# Patient Record
Sex: FEMALE | Race: BLACK OR AFRICAN AMERICAN | NOT HISPANIC OR LATINO
[De-identification: names, ages, dates, MRNs, and addresses within clinical notes are randomized per-mention and may not be internally consistent; named-entity substitution may affect disease eponyms.]

---

## 2017-03-13 ENCOUNTER — LABORATORY RESULT (OUTPATIENT)
Age: 62
End: 2017-03-13

## 2017-03-13 ENCOUNTER — OUTPATIENT (OUTPATIENT)
Dept: OUTPATIENT SERVICES | Facility: HOSPITAL | Age: 62
LOS: 1 days | End: 2017-03-13

## 2017-03-13 ENCOUNTER — APPOINTMENT (OUTPATIENT)
Dept: INTERNAL MEDICINE | Facility: HOSPITAL | Age: 62
End: 2017-03-13

## 2017-03-13 VITALS — HEIGHT: 60 IN | BODY MASS INDEX: 45.55 KG/M2 | WEIGHT: 232 LBS

## 2017-03-13 VITALS — HEART RATE: 85 BPM | DIASTOLIC BLOOD PRESSURE: 80 MMHG | SYSTOLIC BLOOD PRESSURE: 122 MMHG

## 2017-03-13 LAB
BASOPHILS # BLD AUTO: 0.01 K/UL — SIGNIFICANT CHANGE UP (ref 0–0.2)
BASOPHILS NFR BLD AUTO: 0.2 % — SIGNIFICANT CHANGE UP (ref 0–2)
BUN SERPL-MCNC: 10 MG/DL — SIGNIFICANT CHANGE UP (ref 7–23)
CALCIUM SERPL-MCNC: 9.5 MG/DL — SIGNIFICANT CHANGE UP (ref 8.4–10.5)
CHLORIDE SERPL-SCNC: 105 MMOL/L — SIGNIFICANT CHANGE UP (ref 98–107)
CO2 SERPL-SCNC: 27 MMOL/L — SIGNIFICANT CHANGE UP (ref 22–31)
CREAT SERPL-MCNC: 0.6 MG/DL — SIGNIFICANT CHANGE UP (ref 0.5–1.3)
EOSINOPHIL # BLD AUTO: 0.11 K/UL — SIGNIFICANT CHANGE UP (ref 0–0.5)
EOSINOPHIL NFR BLD AUTO: 1.7 % — SIGNIFICANT CHANGE UP (ref 0–6)
GLUCOSE BLDC GLUCOMTR-MCNC: 87
GLUCOSE SERPL-MCNC: 90 MG/DL — SIGNIFICANT CHANGE UP (ref 70–99)
HBA1C BLD-MCNC: 6.4 % — HIGH (ref 4–5.6)
HCT VFR BLD CALC: 41.6 % — SIGNIFICANT CHANGE UP (ref 34.5–45)
HGB BLD-MCNC: 13.6 G/DL — SIGNIFICANT CHANGE UP (ref 11.5–15.5)
IMM GRANULOCYTES NFR BLD AUTO: 0.2 % — SIGNIFICANT CHANGE UP (ref 0–1.5)
LYMPHOCYTES # BLD AUTO: 2.78 K/UL — SIGNIFICANT CHANGE UP (ref 1–3.3)
LYMPHOCYTES # BLD AUTO: 43.4 % — SIGNIFICANT CHANGE UP (ref 13–44)
MCHC RBC-ENTMCNC: 31.3 PG — SIGNIFICANT CHANGE UP (ref 27–34)
MCHC RBC-ENTMCNC: 32.7 % — SIGNIFICANT CHANGE UP (ref 32–36)
MCV RBC AUTO: 95.6 FL — SIGNIFICANT CHANGE UP (ref 80–100)
MONOCYTES # BLD AUTO: 0.33 K/UL — SIGNIFICANT CHANGE UP (ref 0–0.9)
MONOCYTES NFR BLD AUTO: 5.2 % — SIGNIFICANT CHANGE UP (ref 2–14)
NEUTROPHILS # BLD AUTO: 3.16 K/UL — SIGNIFICANT CHANGE UP (ref 1.8–7.4)
NEUTROPHILS NFR BLD AUTO: 49.3 % — SIGNIFICANT CHANGE UP (ref 43–77)
PLATELET # BLD AUTO: 239 K/UL — SIGNIFICANT CHANGE UP (ref 150–400)
PMV BLD: 11 FL — SIGNIFICANT CHANGE UP (ref 7–13)
POTASSIUM SERPL-MCNC: 4 MMOL/L — SIGNIFICANT CHANGE UP (ref 3.5–5.3)
POTASSIUM SERPL-SCNC: 4 MMOL/L — SIGNIFICANT CHANGE UP (ref 3.5–5.3)
RBC # BLD: 4.35 M/UL — SIGNIFICANT CHANGE UP (ref 3.8–5.2)
RBC # FLD: 13 % — SIGNIFICANT CHANGE UP (ref 10.3–14.5)
SODIUM SERPL-SCNC: 145 MMOL/L — SIGNIFICANT CHANGE UP (ref 135–145)
WBC # BLD: 6.4 K/UL — SIGNIFICANT CHANGE UP (ref 3.8–10.5)
WBC # FLD AUTO: 6.4 K/UL — SIGNIFICANT CHANGE UP (ref 3.8–10.5)

## 2017-03-14 LAB
CREAT UR-MCNC: 62 MG/DL — SIGNIFICANT CHANGE UP
MICROALBUMIN UR-MCNC: < 0.3 MG/DL — SIGNIFICANT CHANGE UP

## 2017-03-15 DIAGNOSIS — E66.9 OBESITY, UNSPECIFIED: ICD-10-CM

## 2017-03-15 DIAGNOSIS — E11.9 TYPE 2 DIABETES MELLITUS WITHOUT COMPLICATIONS: ICD-10-CM

## 2017-03-15 DIAGNOSIS — R14.0 ABDOMINAL DISTENSION (GASEOUS): ICD-10-CM

## 2017-03-15 DIAGNOSIS — I10 ESSENTIAL (PRIMARY) HYPERTENSION: ICD-10-CM

## 2017-03-15 DIAGNOSIS — N62 HYPERTROPHY OF BREAST: ICD-10-CM

## 2017-03-15 DIAGNOSIS — K30 FUNCTIONAL DYSPEPSIA: ICD-10-CM

## 2017-04-18 ENCOUNTER — OUTPATIENT (OUTPATIENT)
Dept: OUTPATIENT SERVICES | Facility: HOSPITAL | Age: 62
LOS: 1 days | End: 2017-04-18

## 2017-04-18 ENCOUNTER — APPOINTMENT (OUTPATIENT)
Dept: PLASTIC SURGERY | Facility: HOSPITAL | Age: 62
End: 2017-04-18

## 2017-04-18 VITALS
WEIGHT: 237 LBS | BODY MASS INDEX: 46.53 KG/M2 | DIASTOLIC BLOOD PRESSURE: 78 MMHG | HEIGHT: 60 IN | SYSTOLIC BLOOD PRESSURE: 135 MMHG | HEART RATE: 85 BPM

## 2017-04-19 DIAGNOSIS — N62 HYPERTROPHY OF BREAST: ICD-10-CM

## 2017-04-27 ENCOUNTER — APPOINTMENT (OUTPATIENT)
Dept: GASTROENTEROLOGY | Facility: HOSPITAL | Age: 62
End: 2017-04-27

## 2017-05-02 ENCOUNTER — RESULT REVIEW (OUTPATIENT)
Age: 62
End: 2017-05-02

## 2017-05-03 ENCOUNTER — APPOINTMENT (OUTPATIENT)
Dept: OBGYN | Facility: HOSPITAL | Age: 62
End: 2017-05-03

## 2017-05-03 ENCOUNTER — LABORATORY RESULT (OUTPATIENT)
Age: 62
End: 2017-05-03

## 2017-05-03 ENCOUNTER — OUTPATIENT (OUTPATIENT)
Dept: OUTPATIENT SERVICES | Facility: HOSPITAL | Age: 62
LOS: 1 days | End: 2017-05-03

## 2017-05-03 VITALS
HEART RATE: 85 BPM | BODY MASS INDEX: 45.7 KG/M2 | WEIGHT: 234 LBS | SYSTOLIC BLOOD PRESSURE: 131 MMHG | DIASTOLIC BLOOD PRESSURE: 82 MMHG

## 2017-05-03 LAB
HBV SURFACE AG SER-ACNC: NEGATIVE — SIGNIFICANT CHANGE UP
HIV1 AG SER QL: SIGNIFICANT CHANGE UP
HIV1+2 AB SPEC QL: SIGNIFICANT CHANGE UP

## 2017-05-04 DIAGNOSIS — Z01.419 ENCOUNTER FOR GYNECOLOGICAL EXAMINATION (GENERAL) (ROUTINE) WITHOUT ABNORMAL FINDINGS: ICD-10-CM

## 2017-05-04 LAB — T PALLIDUM AB TITR SER: NEGATIVE — SIGNIFICANT CHANGE UP

## 2017-05-05 LAB
CYTOLOGY SPEC DOC CYTO: SIGNIFICANT CHANGE UP
HPV HIGH+LOW RISK DNA PNL CVX: SIGNIFICANT CHANGE UP

## 2017-05-30 ENCOUNTER — FORM ENCOUNTER (OUTPATIENT)
Age: 62
End: 2017-05-30

## 2017-05-31 ENCOUNTER — APPOINTMENT (OUTPATIENT)
Dept: MAMMOGRAPHY | Facility: IMAGING CENTER | Age: 62
End: 2017-05-31

## 2017-05-31 ENCOUNTER — APPOINTMENT (OUTPATIENT)
Dept: ULTRASOUND IMAGING | Facility: IMAGING CENTER | Age: 62
End: 2017-05-31

## 2017-05-31 ENCOUNTER — OUTPATIENT (OUTPATIENT)
Dept: OUTPATIENT SERVICES | Facility: HOSPITAL | Age: 62
LOS: 1 days | End: 2017-05-31
Payer: MEDICAID

## 2017-05-31 DIAGNOSIS — Z00.8 ENCOUNTER FOR OTHER GENERAL EXAMINATION: ICD-10-CM

## 2017-05-31 PROCEDURE — 77063 BREAST TOMOSYNTHESIS BI: CPT

## 2017-05-31 PROCEDURE — 77067 SCR MAMMO BI INCL CAD: CPT

## 2017-07-13 ENCOUNTER — APPOINTMENT (OUTPATIENT)
Dept: GASTROENTEROLOGY | Facility: HOSPITAL | Age: 62
End: 2017-07-13

## 2017-07-25 ENCOUNTER — RX RENEWAL (OUTPATIENT)
Age: 62
End: 2017-07-25

## 2017-08-01 ENCOUNTER — OTHER (OUTPATIENT)
Age: 62
End: 2017-08-01

## 2017-08-02 ENCOUNTER — RESULT REVIEW (OUTPATIENT)
Age: 62
End: 2017-08-02

## 2017-09-06 ENCOUNTER — APPOINTMENT (OUTPATIENT)
Dept: INTERNAL MEDICINE | Facility: HOSPITAL | Age: 62
End: 2017-09-06

## 2017-09-06 ENCOUNTER — LABORATORY RESULT (OUTPATIENT)
Age: 62
End: 2017-09-06

## 2017-09-06 ENCOUNTER — OUTPATIENT (OUTPATIENT)
Dept: OUTPATIENT SERVICES | Facility: HOSPITAL | Age: 62
LOS: 1 days | End: 2017-09-06

## 2017-09-06 VITALS — DIASTOLIC BLOOD PRESSURE: 74 MMHG | SYSTOLIC BLOOD PRESSURE: 135 MMHG | HEART RATE: 81 BPM

## 2017-09-06 VITALS — WEIGHT: 230 LBS | HEIGHT: 60 IN | BODY MASS INDEX: 45.16 KG/M2

## 2017-09-06 LAB
CHOLEST SERPL-MCNC: 160 MG/DL — SIGNIFICANT CHANGE UP (ref 120–199)
HBA1C BLD-MCNC: 6.5 % — HIGH (ref 4–5.6)
HDLC SERPL-MCNC: 66 MG/DL — HIGH (ref 45–65)
LIPID PNL WITH DIRECT LDL SERPL: 76 MG/DL — SIGNIFICANT CHANGE UP
TRIGL SERPL-MCNC: 126 MG/DL — SIGNIFICANT CHANGE UP (ref 10–149)

## 2017-09-07 DIAGNOSIS — E11.9 TYPE 2 DIABETES MELLITUS WITHOUT COMPLICATIONS: ICD-10-CM

## 2017-09-07 DIAGNOSIS — Z00.00 ENCOUNTER FOR GENERAL ADULT MEDICAL EXAMINATION WITHOUT ABNORMAL FINDINGS: ICD-10-CM

## 2017-09-07 DIAGNOSIS — I10 ESSENTIAL (PRIMARY) HYPERTENSION: ICD-10-CM

## 2017-09-07 DIAGNOSIS — E78.5 HYPERLIPIDEMIA, UNSPECIFIED: ICD-10-CM

## 2017-09-07 DIAGNOSIS — Z23 ENCOUNTER FOR IMMUNIZATION: ICD-10-CM

## 2017-09-07 LAB — GLUCOSE BLDC GLUCOMTR-MCNC: 104

## 2017-09-19 ENCOUNTER — APPOINTMENT (OUTPATIENT)
Dept: PLASTIC SURGERY | Facility: HOSPITAL | Age: 62
End: 2017-09-19

## 2017-09-19 ENCOUNTER — MED ADMIN CHARGE (OUTPATIENT)
Age: 62
End: 2017-09-19

## 2017-10-02 ENCOUNTER — APPOINTMENT (OUTPATIENT)
Dept: OPHTHALMOLOGY | Facility: CLINIC | Age: 62
End: 2017-10-02

## 2018-02-28 ENCOUNTER — LABORATORY RESULT (OUTPATIENT)
Age: 63
End: 2018-02-28

## 2018-02-28 ENCOUNTER — OUTPATIENT (OUTPATIENT)
Dept: OUTPATIENT SERVICES | Facility: HOSPITAL | Age: 63
LOS: 1 days | End: 2018-02-28

## 2018-02-28 ENCOUNTER — APPOINTMENT (OUTPATIENT)
Dept: INTERNAL MEDICINE | Facility: HOSPITAL | Age: 63
End: 2018-02-28

## 2018-02-28 VITALS
HEIGHT: 60 IN | BODY MASS INDEX: 45.16 KG/M2 | DIASTOLIC BLOOD PRESSURE: 80 MMHG | SYSTOLIC BLOOD PRESSURE: 132 MMHG | WEIGHT: 230 LBS

## 2018-02-28 DIAGNOSIS — E11.9 TYPE 2 DIABETES MELLITUS W/OUT COMPLICATIONS: ICD-10-CM

## 2018-02-28 DIAGNOSIS — E66.9 OBESITY, UNSPECIFIED: ICD-10-CM

## 2018-02-28 LAB
ALBUMIN SERPL ELPH-MCNC: 4.6 G/DL — SIGNIFICANT CHANGE UP (ref 3.3–5)
ALP SERPL-CCNC: 83 U/L — SIGNIFICANT CHANGE UP (ref 40–120)
ALT FLD-CCNC: 29 U/L — SIGNIFICANT CHANGE UP (ref 4–33)
AST SERPL-CCNC: 30 U/L — SIGNIFICANT CHANGE UP (ref 4–32)
BASOPHILS # BLD AUTO: 0.03 K/UL — SIGNIFICANT CHANGE UP (ref 0–0.2)
BASOPHILS NFR BLD AUTO: 0.5 % — SIGNIFICANT CHANGE UP (ref 0–2)
BILIRUB SERPL-MCNC: 0.6 MG/DL — SIGNIFICANT CHANGE UP (ref 0.2–1.2)
BUN SERPL-MCNC: 16 MG/DL — SIGNIFICANT CHANGE UP (ref 7–23)
CALCIUM SERPL-MCNC: 9.4 MG/DL — SIGNIFICANT CHANGE UP (ref 8.4–10.5)
CHLORIDE SERPL-SCNC: 105 MMOL/L — SIGNIFICANT CHANGE UP (ref 98–107)
CO2 SERPL-SCNC: 28 MMOL/L — SIGNIFICANT CHANGE UP (ref 22–31)
CREAT SERPL-MCNC: 0.63 MG/DL — SIGNIFICANT CHANGE UP (ref 0.5–1.3)
CREAT UR-MCNC: 220 MG/DL — SIGNIFICANT CHANGE UP
EOSINOPHIL # BLD AUTO: 0.11 K/UL — SIGNIFICANT CHANGE UP (ref 0–0.5)
EOSINOPHIL NFR BLD AUTO: 1.9 % — SIGNIFICANT CHANGE UP (ref 0–6)
GLUCOSE BLDC GLUCOMTR-MCNC: 101
GLUCOSE SERPL-MCNC: 96 MG/DL — SIGNIFICANT CHANGE UP (ref 70–99)
HBA1C BLD-MCNC: 6.5 % — HIGH (ref 4–5.6)
HCT VFR BLD CALC: 40.8 % — SIGNIFICANT CHANGE UP (ref 34.5–45)
HGB BLD-MCNC: 13.7 G/DL — SIGNIFICANT CHANGE UP (ref 11.5–15.5)
IMM GRANULOCYTES # BLD AUTO: 0.01 # — SIGNIFICANT CHANGE UP
IMM GRANULOCYTES NFR BLD AUTO: 0.2 % — SIGNIFICANT CHANGE UP (ref 0–1.5)
LYMPHOCYTES # BLD AUTO: 2.15 K/UL — SIGNIFICANT CHANGE UP (ref 1–3.3)
LYMPHOCYTES # BLD AUTO: 36.8 % — SIGNIFICANT CHANGE UP (ref 13–44)
MCHC RBC-ENTMCNC: 31.9 PG — SIGNIFICANT CHANGE UP (ref 27–34)
MCHC RBC-ENTMCNC: 33.6 % — SIGNIFICANT CHANGE UP (ref 32–36)
MCV RBC AUTO: 94.9 FL — SIGNIFICANT CHANGE UP (ref 80–100)
MICROALBUMIN UR-MCNC: 2.1 MG/DL — SIGNIFICANT CHANGE UP
MICROALBUMIN/CREAT UR-RTO: 10 MG/G — SIGNIFICANT CHANGE UP (ref 0–30)
MONOCYTES # BLD AUTO: 0.45 K/UL — SIGNIFICANT CHANGE UP (ref 0–0.9)
MONOCYTES NFR BLD AUTO: 7.7 % — SIGNIFICANT CHANGE UP (ref 2–14)
NEUTROPHILS # BLD AUTO: 3.09 K/UL — SIGNIFICANT CHANGE UP (ref 1.8–7.4)
NEUTROPHILS NFR BLD AUTO: 52.9 % — SIGNIFICANT CHANGE UP (ref 43–77)
NRBC # FLD: 0 — SIGNIFICANT CHANGE UP
PLATELET # BLD AUTO: 205 K/UL — SIGNIFICANT CHANGE UP (ref 150–400)
PMV BLD: 11.2 FL — SIGNIFICANT CHANGE UP (ref 7–13)
POTASSIUM SERPL-MCNC: 3.9 MMOL/L — SIGNIFICANT CHANGE UP (ref 3.5–5.3)
POTASSIUM SERPL-SCNC: 3.9 MMOL/L — SIGNIFICANT CHANGE UP (ref 3.5–5.3)
PROT SERPL-MCNC: 7.6 G/DL — SIGNIFICANT CHANGE UP (ref 6–8.3)
RBC # BLD: 4.3 M/UL — SIGNIFICANT CHANGE UP (ref 3.8–5.2)
RBC # FLD: 12.8 % — SIGNIFICANT CHANGE UP (ref 10.3–14.5)
SODIUM SERPL-SCNC: 144 MMOL/L — SIGNIFICANT CHANGE UP (ref 135–145)
WBC # BLD: 5.84 K/UL — SIGNIFICANT CHANGE UP (ref 3.8–10.5)
WBC # FLD AUTO: 5.84 K/UL — SIGNIFICANT CHANGE UP (ref 3.8–10.5)

## 2018-02-28 RX ORDER — ZOSTER VACCINE RECOMBINANT, ADJUVANTED 50 MCG/0.5
50 KIT INTRAMUSCULAR
Qty: 1 | Refills: 0 | Status: ACTIVE | COMMUNITY
Start: 2018-02-28 | End: 1900-01-01

## 2018-03-06 DIAGNOSIS — E11.69 TYPE 2 DIABETES MELLITUS WITH OTHER SPECIFIED COMPLICATION: ICD-10-CM

## 2018-03-06 DIAGNOSIS — E11.9 TYPE 2 DIABETES MELLITUS WITHOUT COMPLICATIONS: ICD-10-CM

## 2018-03-06 DIAGNOSIS — E66.01 MORBID (SEVERE) OBESITY DUE TO EXCESS CALORIES: ICD-10-CM

## 2018-03-06 DIAGNOSIS — Z00.00 ENCOUNTER FOR GENERAL ADULT MEDICAL EXAMINATION WITHOUT ABNORMAL FINDINGS: ICD-10-CM

## 2018-03-06 DIAGNOSIS — I10 ESSENTIAL (PRIMARY) HYPERTENSION: ICD-10-CM

## 2018-03-06 DIAGNOSIS — E78.5 HYPERLIPIDEMIA, UNSPECIFIED: ICD-10-CM

## 2018-03-13 ENCOUNTER — MED ADMIN CHARGE (OUTPATIENT)
Age: 63
End: 2018-03-13

## 2018-05-04 ENCOUNTER — APPOINTMENT (OUTPATIENT)
Dept: OPHTHALMOLOGY | Facility: CLINIC | Age: 63
End: 2018-05-04

## 2018-05-25 ENCOUNTER — APPOINTMENT (OUTPATIENT)
Dept: OPHTHALMOLOGY | Facility: CLINIC | Age: 63
End: 2018-05-25

## 2018-07-05 ENCOUNTER — FORM ENCOUNTER (OUTPATIENT)
Age: 63
End: 2018-07-05

## 2018-07-06 ENCOUNTER — APPOINTMENT (OUTPATIENT)
Dept: MAMMOGRAPHY | Facility: IMAGING CENTER | Age: 63
End: 2018-07-06
Payer: MEDICAID

## 2018-07-06 ENCOUNTER — OUTPATIENT (OUTPATIENT)
Dept: OUTPATIENT SERVICES | Facility: HOSPITAL | Age: 63
LOS: 1 days | End: 2018-07-06
Payer: MEDICAID

## 2018-07-06 ENCOUNTER — APPOINTMENT (OUTPATIENT)
Dept: ULTRASOUND IMAGING | Facility: IMAGING CENTER | Age: 63
End: 2018-07-06
Payer: MEDICAID

## 2018-07-06 DIAGNOSIS — Z00.8 ENCOUNTER FOR OTHER GENERAL EXAMINATION: ICD-10-CM

## 2018-07-06 PROCEDURE — 76641 ULTRASOUND BREAST COMPLETE: CPT | Mod: 26,50

## 2018-07-06 PROCEDURE — 77066 DX MAMMO INCL CAD BI: CPT | Mod: 26

## 2018-07-06 PROCEDURE — G0279: CPT | Mod: 26

## 2018-07-06 PROCEDURE — G0279: CPT

## 2018-07-06 PROCEDURE — 77066 DX MAMMO INCL CAD BI: CPT

## 2018-07-06 PROCEDURE — 76641 ULTRASOUND BREAST COMPLETE: CPT

## 2018-07-16 ENCOUNTER — APPOINTMENT (OUTPATIENT)
Dept: MRI IMAGING | Facility: IMAGING CENTER | Age: 63
End: 2018-07-16

## 2018-07-16 ENCOUNTER — APPOINTMENT (OUTPATIENT)
Dept: ULTRASOUND IMAGING | Facility: IMAGING CENTER | Age: 63
End: 2018-07-16

## 2018-07-23 ENCOUNTER — APPOINTMENT (OUTPATIENT)
Dept: MRI IMAGING | Facility: IMAGING CENTER | Age: 63
End: 2018-07-23

## 2018-07-23 ENCOUNTER — APPOINTMENT (OUTPATIENT)
Dept: ULTRASOUND IMAGING | Facility: IMAGING CENTER | Age: 63
End: 2018-07-23

## 2018-07-27 ENCOUNTER — APPOINTMENT (OUTPATIENT)
Dept: INTERNAL MEDICINE | Facility: HOSPITAL | Age: 63
End: 2018-07-27
Payer: MEDICAID

## 2018-07-27 ENCOUNTER — OUTPATIENT (OUTPATIENT)
Dept: OUTPATIENT SERVICES | Facility: HOSPITAL | Age: 63
LOS: 1 days | End: 2018-07-27

## 2018-07-27 VITALS — DIASTOLIC BLOOD PRESSURE: 90 MMHG | SYSTOLIC BLOOD PRESSURE: 150 MMHG

## 2018-07-27 VITALS — HEIGHT: 60 IN | WEIGHT: 229 LBS | BODY MASS INDEX: 44.96 KG/M2

## 2018-07-27 DIAGNOSIS — Z87.19 PERSONAL HISTORY OF OTHER DISEASES OF THE DIGESTIVE SYSTEM: ICD-10-CM

## 2018-07-27 DIAGNOSIS — R07.89 OTHER CHEST PAIN: ICD-10-CM

## 2018-07-27 DIAGNOSIS — R10.13 EPIGASTRIC PAIN: ICD-10-CM

## 2018-07-27 DIAGNOSIS — Z01.419 ENCOUNTER FOR GYNECOLOGICAL EXAMINATION (GENERAL) (ROUTINE) W/OUT ABNORMAL FINDINGS: ICD-10-CM

## 2018-07-27 PROCEDURE — 99214 OFFICE O/P EST MOD 30 MIN: CPT | Mod: GC

## 2018-07-27 RX ORDER — ATORVASTATIN CALCIUM 40 MG/1
40 TABLET, FILM COATED ORAL
Qty: 30 | Refills: 6 | Status: DISCONTINUED | COMMUNITY
Start: 2017-09-06 | End: 2018-07-27

## 2018-07-27 NOTE — REVIEW OF SYSTEMS
[Muscle Pain] : muscle pain [Negative] : Heme/Lymph [FreeTextEntry1] : left breast swelling, redness, pain in the past. Not at presentation

## 2018-07-27 NOTE — PHYSICAL EXAM
[No Acute Distress] : no acute distress [Well-Appearing] : well-appearing [Normal Sclera/Conjunctiva] : normal sclera/conjunctiva [EOMI] : extraocular movements intact [No JVD] : no jugular venous distention [Supple] : supple [No Lymphadenopathy] : no lymphadenopathy [No Respiratory Distress] : no respiratory distress  [Clear to Auscultation] : lungs were clear to auscultation bilaterally [No Accessory Muscle Use] : no accessory muscle use [Normal Rate] : normal rate  [Regular Rhythm] : with a regular rhythm [Normal S1, S2] : normal S1 and S2 [No Murmur] : no murmur heard [No Carotid Bruits] : no carotid bruits [Soft] : abdomen soft [Non Tender] : non-tender [Non-distended] : non-distended [No Masses] : no abdominal mass palpated [No Rash] : no rash [Normal Affect] : the affect was normal [Normal Insight/Judgement] : insight and judgment were intact [Comprehensive Foot Exam Normal] : Right and left foot were examined and both feet are normal. No ulcers in either foot. Toes are normal and with full ROM.  Normal tactile sensation with monofilament testing throughout both feet [de-identified] : no puckering; nontender, notedematous; no discharge elicited

## 2018-07-27 NOTE — ASSESSMENT
[FreeTextEntry1] : Patient will call if any other referrals needed; radiology should email Dr. Morgan if other referrals required. \par \par Follow up in 10 weeks

## 2018-07-27 NOTE — PHYSICAL EXAM
[No Acute Distress] : no acute distress [Well-Appearing] : well-appearing [Normal Sclera/Conjunctiva] : normal sclera/conjunctiva [EOMI] : extraocular movements intact [No JVD] : no jugular venous distention [Supple] : supple [No Lymphadenopathy] : no lymphadenopathy [No Respiratory Distress] : no respiratory distress  [Clear to Auscultation] : lungs were clear to auscultation bilaterally [No Accessory Muscle Use] : no accessory muscle use [Normal Rate] : normal rate  [Regular Rhythm] : with a regular rhythm [Normal S1, S2] : normal S1 and S2 [No Murmur] : no murmur heard [No Carotid Bruits] : no carotid bruits [Soft] : abdomen soft [Non Tender] : non-tender [Non-distended] : non-distended [No Masses] : no abdominal mass palpated [No Rash] : no rash [Normal Affect] : the affect was normal [Normal Insight/Judgement] : insight and judgment were intact [Comprehensive Foot Exam Normal] : Right and left foot were examined and both feet are normal. No ulcers in either foot. Toes are normal and with full ROM.  Normal tactile sensation with monofilament testing throughout both feet [de-identified] : no puckering; nontender, notedematous; no discharge elicited

## 2018-07-27 NOTE — HISTORY OF PRESENT ILLNESS
[FreeTextEntry1] : requires referral for Ultrasound Guided Core Biopsy of Breast\par 6-month follow up  [de-identified] : The patient is a 62 y/o female with PMHx of DM, HTN, HLD, and breast abscesses in the past who is here for a 6 month follow up, and also required a referral for an US-guided core biopsy of the left breast.\par \par The patient was found to have BIRDADs 4 on mammography and was sent for MRI and US Guided Biopsy after the prior visit. The MRI was performed but radiology was unable to perform the biopsy because no referral could be found. Therefore, the patient is here today to obtain a new one. \par The patient stated that during her last mammography in early July, she was having brown nipple discharge. In addition, she had an episode of a very large, red swollen breast, with associated fever. She states that she has had these types of problems with her breast her whole life. \par \par She is also here to follow up with health maintenance. The patient stopped taking amlodipine 5 mg 4 months ago. She spoke with her pharmacist after she was having muscle cramps and was told that it may be secondary to the amlodipine, so the patient stopped taking it. Prior to stopping, she was advised to call the medicine office, which she did not. The patient is adherent with the remainder of her medications, however.

## 2018-07-27 NOTE — HISTORY OF PRESENT ILLNESS
[FreeTextEntry1] : requires referral for Ultrasound Guided Core Biopsy of Breast\par 6-month follow up  [de-identified] : The patient is a 64 y/o female with PMHx of DM, HTN, HLD, and breast abscesses in the past who is here for a 6 month follow up, and also required a referral for an US-guided core biopsy of the left breast.\par \par The patient was found to have BIRDADs 4 on mammography and was sent for MRI and US Guided Biopsy after the prior visit. The MRI was performed but radiology was unable to perform the biopsy because no referral could be found. Therefore, the patient is here today to obtain a new one. \par The patient stated that during her last mammography in early July, she was having brown nipple discharge. In addition, she had an episode of a very large, red swollen breast, with associated fever. She states that she has had these types of problems with her breast her whole life. \par \par She is also here to follow up with health maintenance. The patient stopped taking amlodipine 5 mg 4 months ago. She spoke with her pharmacist after she was having muscle cramps and was told that it may be secondary to the amlodipine, so the patient stopped taking it. Prior to stopping, she was advised to call the medicine office, which she did not. The patient is adherent with the remainder of her medications, however.

## 2018-07-30 DIAGNOSIS — I10 ESSENTIAL (PRIMARY) HYPERTENSION: ICD-10-CM

## 2018-07-30 DIAGNOSIS — E78.5 HYPERLIPIDEMIA, UNSPECIFIED: ICD-10-CM

## 2018-07-30 DIAGNOSIS — E11.69 TYPE 2 DIABETES MELLITUS WITH OTHER SPECIFIED COMPLICATION: ICD-10-CM

## 2018-07-30 DIAGNOSIS — R92.8 OTHER ABNORMAL AND INCONCLUSIVE FINDINGS ON DIAGNOSTIC IMAGING OF BREAST: ICD-10-CM

## 2018-07-31 LAB — GLUCOSE BLDC GLUCOMTR-MCNC: 92

## 2018-08-06 ENCOUNTER — OUTPATIENT (OUTPATIENT)
Dept: OUTPATIENT SERVICES | Facility: HOSPITAL | Age: 63
LOS: 1 days | End: 2018-08-06
Payer: MEDICAID

## 2018-08-06 ENCOUNTER — APPOINTMENT (OUTPATIENT)
Dept: ULTRASOUND IMAGING | Facility: IMAGING CENTER | Age: 63
End: 2018-08-06
Payer: MEDICAID

## 2018-08-06 DIAGNOSIS — R92.8 OTHER ABNORMAL AND INCONCLUSIVE FINDINGS ON DIAGNOSTIC IMAGING OF BREAST: ICD-10-CM

## 2018-08-06 PROCEDURE — 76642 ULTRASOUND BREAST LIMITED: CPT | Mod: 26,LT

## 2018-08-06 PROCEDURE — 76642 ULTRASOUND BREAST LIMITED: CPT

## 2018-10-01 ENCOUNTER — LABORATORY RESULT (OUTPATIENT)
Age: 63
End: 2018-10-01

## 2018-10-01 ENCOUNTER — MED ADMIN CHARGE (OUTPATIENT)
Age: 63
End: 2018-10-01

## 2018-10-01 ENCOUNTER — APPOINTMENT (OUTPATIENT)
Dept: INTERNAL MEDICINE | Facility: HOSPITAL | Age: 63
End: 2018-10-01
Payer: MEDICAID

## 2018-10-01 ENCOUNTER — OUTPATIENT (OUTPATIENT)
Dept: OUTPATIENT SERVICES | Facility: HOSPITAL | Age: 63
LOS: 1 days | End: 2018-10-01

## 2018-10-01 ENCOUNTER — OTHER (OUTPATIENT)
Age: 63
End: 2018-10-01

## 2018-10-01 VITALS — DIASTOLIC BLOOD PRESSURE: 60 MMHG | HEART RATE: 72 BPM | SYSTOLIC BLOOD PRESSURE: 130 MMHG

## 2018-10-01 VITALS — BODY MASS INDEX: 45.16 KG/M2 | WEIGHT: 230 LBS | HEIGHT: 60 IN

## 2018-10-01 LAB
CHOLEST SERPL-MCNC: 139 MG/DL — SIGNIFICANT CHANGE UP (ref 120–199)
GLUCOSE BLDC GLUCOMTR-MCNC: 103
HBA1C BLD-MCNC: 6.1 % — HIGH (ref 4–5.6)
HDLC SERPL-MCNC: 67 MG/DL — HIGH (ref 45–65)
LIPID PNL WITH DIRECT LDL SERPL: 65 MG/DL — SIGNIFICANT CHANGE UP
TRIGL SERPL-MCNC: 98 MG/DL — SIGNIFICANT CHANGE UP (ref 10–149)

## 2018-10-01 PROCEDURE — 99213 OFFICE O/P EST LOW 20 MIN: CPT | Mod: GE

## 2018-10-01 NOTE — PHYSICAL EXAM
[No Acute Distress] : no acute distress [Well Nourished] : well nourished [Well Developed] : well developed [PERRL] : pupils equal round and reactive to light [EOMI] : extraocular movements intact [No Respiratory Distress] : no respiratory distress  [Clear to Auscultation] : lungs were clear to auscultation bilaterally [Normal Rate] : normal rate  [Regular Rhythm] : with a regular rhythm [Normal S1, S2] : normal S1 and S2 [No Edema] : there was no peripheral edema [Soft] : abdomen soft [Non Tender] : non-tender [No HSM] : no HSM [Normal Bowel Sounds] : normal bowel sounds [No Rash] : no rash [No Skin Lesions] : no skin lesions [Normal Affect] : the affect was normal [Alert and Oriented x3] : oriented to person, place, and time [Normal Mood] : the mood was normal

## 2018-10-02 DIAGNOSIS — Z23 ENCOUNTER FOR IMMUNIZATION: ICD-10-CM

## 2018-10-02 DIAGNOSIS — E11.69 TYPE 2 DIABETES MELLITUS WITH OTHER SPECIFIED COMPLICATION: ICD-10-CM

## 2018-10-02 DIAGNOSIS — N63.0 UNSPECIFIED LUMP IN UNSPECIFIED BREAST: ICD-10-CM

## 2018-10-02 DIAGNOSIS — R92.8 OTHER ABNORMAL AND INCONCLUSIVE FINDINGS ON DIAGNOSTIC IMAGING OF BREAST: ICD-10-CM

## 2018-10-02 DIAGNOSIS — E78.5 HYPERLIPIDEMIA, UNSPECIFIED: ICD-10-CM

## 2018-10-02 DIAGNOSIS — I10 ESSENTIAL (PRIMARY) HYPERTENSION: ICD-10-CM

## 2018-10-02 DIAGNOSIS — Z00.00 ENCOUNTER FOR GENERAL ADULT MEDICAL EXAMINATION WITHOUT ABNORMAL FINDINGS: ICD-10-CM

## 2018-10-12 NOTE — PLAN
[FreeTextEntry1] : 63F with PMHx of HTN, DMII (last A1C 6.5% in 2/2018), HLD, abnormal mammogram in 6/2018 who presents for follow-up\par \par #HTN\par - currently well controlled on current regimen of amlodipine 5mg and HCTZ 25mg QD, cont current regimen\par \par #DM\par - Last A1C 6.5% in 2/2018, will recheck today\par - c/w metformin 500mg BID\par - Pt deferred diabetic teaching at this time; states she does not want to take her blood sugar at home. Assistance with learning was offered at this visit but the patient declined.\par - Counseled on adhering to diabetic diet low in refined sugar and participating in daily exercise\par \par #HLD\par - c/w lower dose atorvastatin 20mg as her muscle cramps are now resolved\par - will recheck lipid panel today\par \par #Abnormal Mammography\par - per patient, she went for possible biopsy, but after ultrasound was told there was nothing to biopsy.\par - resent referral for breast MRI\par - referred pt to breast clinic for further workup\par \par #HCM\par - pt due for colonoscopy this year (last in 2015), referral sent\par - received flu shot this visit, UTD on shingles vaccine, TDAP, and pneumococcal vaccine\par \par RTC in 2-3 months\par \par Case d/w Dr. North\par \par Cherise Tirado MD, MPH\par F5\par

## 2018-10-12 NOTE — END OF VISIT
[] : Resident [FreeTextEntry3] : 63f WITH t2dm, htn, hld, abnormal mammogram here today for follow-up. Will send patient to breast clinic and breast MRI to further assess findings on mammogram. Referral for GI given as pt due for colonoscopy. Repeat lipid profile and HbA1c today. Agree with plan and findings as above.

## 2018-10-12 NOTE — HISTORY OF PRESENT ILLNESS
[FreeTextEntry1] : Follow-up [de-identified] : Pt is a 63F with PMHx of HTN, DMII (last A1C 6.5% in 2/2018), HLD, abnormal mammogram in 6/2018 who presents for follow-up. Pt was last seen in clinic in 7/2018. At that time she was c/o muscle cramps and had stopped taking her amlodipine 5mg in an attempt to alleviate them. At that time she was hypertensive to 150s systolic. The decision was made to restart amlodipine 5mg and decrease the dose of atorvastatin to 20mg. She was also referred for an ultrasound and core biopsy of a mammogram in 6/2018 that showed Birads 4C lesion. \par \par Pt reports since previous visit she has been compliant with her daily medications and has not had any adverse events. The muscle cramps she was experiencing at the time of her prior visit have since resolved. She does not check her BP at home. She also does not check her blood sugar. She has previously been extensively counseled on taking her blood sugar at home, however, she does not want to take her blood sugar at home. She says taking her blood sugar at home would cause her anxiety and she does not want to do it. \par \par In regards to her abnormal mammogram in 7/2018 that showed BIRADS4 in the left breast, she reports going for an ultrasound and possible core needle biopsy. However, she was told at her ultrasound appointment that there was nothing to biopsy, so it was never performed. She has not undergone a left breast MRI. She reports no interim breast pain, nipple discharge, breast discoloration, abnormal lumps. \par \par She denies any interim sx of polyuria, polydipsia, dizziness, lightheadedness, HA, F/C/N/V, CP/SOB.

## 2019-01-03 ENCOUNTER — APPOINTMENT (OUTPATIENT)
Dept: GASTROENTEROLOGY | Facility: HOSPITAL | Age: 64
End: 2019-01-03
Payer: MEDICAID

## 2019-01-03 ENCOUNTER — OUTPATIENT (OUTPATIENT)
Dept: OUTPATIENT SERVICES | Facility: HOSPITAL | Age: 64
LOS: 1 days | End: 2019-01-03

## 2019-01-03 ENCOUNTER — OTHER (OUTPATIENT)
Age: 64
End: 2019-01-03

## 2019-01-03 VITALS
HEART RATE: 75 BPM | SYSTOLIC BLOOD PRESSURE: 121 MMHG | WEIGHT: 233 LBS | HEIGHT: 60 IN | DIASTOLIC BLOOD PRESSURE: 65 MMHG | BODY MASS INDEX: 45.75 KG/M2

## 2019-01-03 DIAGNOSIS — K63.5 POLYP OF COLON: ICD-10-CM

## 2019-01-03 PROCEDURE — 99213 OFFICE O/P EST LOW 20 MIN: CPT | Mod: GC

## 2019-01-14 ENCOUNTER — APPOINTMENT (OUTPATIENT)
Age: 64
End: 2019-01-14

## 2019-01-19 ENCOUNTER — EMERGENCY (EMERGENCY)
Facility: HOSPITAL | Age: 64
LOS: 1 days | Discharge: HOME | End: 2019-01-19
Attending: EMERGENCY MEDICINE | Admitting: EMERGENCY MEDICINE
Payer: MEDICAID

## 2019-01-19 VITALS
DIASTOLIC BLOOD PRESSURE: 84 MMHG | TEMPERATURE: 98 F | SYSTOLIC BLOOD PRESSURE: 138 MMHG | RESPIRATION RATE: 16 BRPM | HEART RATE: 81 BPM | OXYGEN SATURATION: 97 %

## 2019-01-19 PROCEDURE — 99284 EMERGENCY DEPT VISIT MOD MDM: CPT

## 2019-01-19 PROCEDURE — 70450 CT HEAD/BRAIN W/O DYE: CPT | Mod: 26

## 2019-01-19 NOTE — ED PROVIDER NOTE - MEDICAL DECISION MAKING DETAILS
63yF with h/o of HTN, diabetes and HLD presents with lightheadedness, and visual hallucinations (mosquitoes) of ~10 month duration without any significant physical findings.  Concern for psychological component

## 2019-01-19 NOTE — ED PROVIDER NOTE - PHYSICAL EXAMINATION
Gen: AAOx3, non-toxic,   Head: NCAT  HEENT: EOMI, oral mucosa moist, normal conjunctiva  Lung: CTAB, no respiratory distress,   CV: RRR, no murmurs, rubs or gallops  Abd: soft, NTND, no guarding, no  Neuro: No focal sensory or motor deficits  Skin: Warm, well perfused, petechiae on right hand  Psych: normal affect, active visual hallucination  ~Russell Alex M.D. Resident

## 2019-01-19 NOTE — ED PROVIDER NOTE - NSFOLLOWUPINSTRUCTIONS_ED_ALL_ED_FT
Please follow up with your primary care physician in 24-48 hours  You can take over the counterClaritin 10mg  1 tablet/ day  Please come back if any of the following: Fever, chills, changes in vision, continued hallucinations, thoughts of hurting yourself or anybody else or any major concerns.

## 2019-01-19 NOTE — ED PROVIDER NOTE - ATTENDING CONTRIBUTION TO CARE
I performed a face-to-face evaluation of the patient and performed a history and physical examination. I agree with the history and physical examination.    63 F, DM, HTN, HL, p/w seeing "mosquitoes" that bite her x 2 wks. Every AM, does 3 hrs of heavy praying f/b heavy exercising. Notes post-event dizziness and HA x mos days. Improved since doing less time of heavy praying and exercise. Ophtho said nothing wrong w/ eyes. PE unremarkable except for occasional skin excoriations. Getting/needing less sleep since started exercising heavily. Dx: visual and tactile hallucinations. Perhaps mild hernan. Patient is happy, exercising, and not interested in seeing Psych. Will treat Sx of "mosquito" bites w/ 2nd-gen. H1 blocker. CT brain to make sure no visual cortex origin of hallucinations.

## 2019-01-19 NOTE — ED PROVIDER NOTE - NS ED ROS FT
GENERAL: No fever or chills, EYES: no change in vision, HEENT: no trouble swallowing or speaking, CARDIAC: no chest pain, PULMONARY: no cough or SOB, GI: no abdominal pain, no nausea, no vomiting, no diarrhea or constipation, : No changes in urination, SKIN: +burning and itching, NEURO: no headache,    MSK: No joint pain ~Russell Alex M.D. Resident

## 2019-01-23 ENCOUNTER — OTHER (OUTPATIENT)
Age: 64
End: 2019-01-23

## 2019-01-24 ENCOUNTER — RX RENEWAL (OUTPATIENT)
Age: 64
End: 2019-01-24

## 2019-01-29 NOTE — DISCUSSION/SUMMARY
[ED Visit] : a visit to ED [FreeTextEntry1] : Pt seen in ED for light headedness and "mosquitos" in her field of vision. CT-H without abnormality. I spoke to pt who states pt was seen by ophtho with no abnormalities. pt reports visual findings are still there but light headedness has improved. We will schedule patient for follow up here within one month.

## 2019-02-05 ENCOUNTER — OUTPATIENT (OUTPATIENT)
Dept: OUTPATIENT SERVICES | Facility: HOSPITAL | Age: 64
LOS: 1 days | Discharge: HOME | End: 2019-02-05
Payer: MEDICAID

## 2019-02-05 DIAGNOSIS — K63.5 POLYP OF COLON: ICD-10-CM

## 2019-02-05 LAB — GLUCOSE BLDC GLUCOMTR-MCNC: 92 MG/DL — SIGNIFICANT CHANGE UP (ref 70–99)

## 2019-02-05 PROCEDURE — 45378 DIAGNOSTIC COLONOSCOPY: CPT | Mod: GC

## 2019-02-12 ENCOUNTER — APPOINTMENT (OUTPATIENT)
Dept: OPHTHALMOLOGY | Facility: CLINIC | Age: 64
End: 2019-02-12

## 2019-03-05 ENCOUNTER — OTHER (OUTPATIENT)
Age: 64
End: 2019-03-05

## 2019-03-27 ENCOUNTER — LABORATORY RESULT (OUTPATIENT)
Age: 64
End: 2019-03-27

## 2019-03-27 ENCOUNTER — APPOINTMENT (OUTPATIENT)
Dept: INTERNAL MEDICINE | Facility: HOSPITAL | Age: 64
End: 2019-03-27
Payer: MEDICAID

## 2019-03-27 ENCOUNTER — OUTPATIENT (OUTPATIENT)
Dept: OUTPATIENT SERVICES | Facility: HOSPITAL | Age: 64
LOS: 1 days | End: 2019-03-27

## 2019-03-27 VITALS — DIASTOLIC BLOOD PRESSURE: 80 MMHG | SYSTOLIC BLOOD PRESSURE: 120 MMHG

## 2019-03-27 VITALS — BODY MASS INDEX: 45.35 KG/M2 | HEIGHT: 60 IN | WEIGHT: 231 LBS

## 2019-03-27 LAB
ALBUMIN SERPL ELPH-MCNC: 4.5 G/DL — SIGNIFICANT CHANGE UP (ref 3.3–5)
ALP SERPL-CCNC: 84 U/L — SIGNIFICANT CHANGE UP (ref 40–120)
ALT FLD-CCNC: 30 U/L — SIGNIFICANT CHANGE UP (ref 4–33)
ANION GAP SERPL CALC-SCNC: 13 MMO/L — SIGNIFICANT CHANGE UP (ref 7–14)
AST SERPL-CCNC: 27 U/L — SIGNIFICANT CHANGE UP (ref 4–32)
BASOPHILS # BLD AUTO: 0.02 K/UL — SIGNIFICANT CHANGE UP (ref 0–0.2)
BASOPHILS NFR BLD AUTO: 0.3 % — SIGNIFICANT CHANGE UP (ref 0–2)
BILIRUB SERPL-MCNC: 0.6 MG/DL — SIGNIFICANT CHANGE UP (ref 0.2–1.2)
BUN SERPL-MCNC: 12 MG/DL — SIGNIFICANT CHANGE UP (ref 7–23)
CALCIUM SERPL-MCNC: 9.9 MG/DL — SIGNIFICANT CHANGE UP (ref 8.4–10.5)
CHLORIDE SERPL-SCNC: 100 MMOL/L — SIGNIFICANT CHANGE UP (ref 98–107)
CO2 SERPL-SCNC: 27 MMOL/L — SIGNIFICANT CHANGE UP (ref 22–31)
CREAT SERPL-MCNC: 0.66 MG/DL — SIGNIFICANT CHANGE UP (ref 0.5–1.3)
EOSINOPHIL # BLD AUTO: 0.14 K/UL — SIGNIFICANT CHANGE UP (ref 0–0.5)
EOSINOPHIL NFR BLD AUTO: 2 % — SIGNIFICANT CHANGE UP (ref 0–6)
GLUCOSE BLDC GLUCOMTR-MCNC: 93
GLUCOSE SERPL-MCNC: 81 MG/DL — SIGNIFICANT CHANGE UP (ref 70–99)
HBA1C BLD-MCNC: 6.8 % — HIGH (ref 4–5.6)
HCT VFR BLD CALC: 43.3 % — SIGNIFICANT CHANGE UP (ref 34.5–45)
HGB BLD-MCNC: 14.3 G/DL — SIGNIFICANT CHANGE UP (ref 11.5–15.5)
IMM GRANULOCYTES NFR BLD AUTO: 0.1 % — SIGNIFICANT CHANGE UP (ref 0–1.5)
LYMPHOCYTES # BLD AUTO: 2.48 K/UL — SIGNIFICANT CHANGE UP (ref 1–3.3)
LYMPHOCYTES # BLD AUTO: 35.8 % — SIGNIFICANT CHANGE UP (ref 13–44)
MAGNESIUM SERPL-MCNC: 2.1 MG/DL — SIGNIFICANT CHANGE UP (ref 1.6–2.6)
MCHC RBC-ENTMCNC: 31 PG — SIGNIFICANT CHANGE UP (ref 27–34)
MCHC RBC-ENTMCNC: 33 % — SIGNIFICANT CHANGE UP (ref 32–36)
MCV RBC AUTO: 93.7 FL — SIGNIFICANT CHANGE UP (ref 80–100)
MONOCYTES # BLD AUTO: 0.55 K/UL — SIGNIFICANT CHANGE UP (ref 0–0.9)
MONOCYTES NFR BLD AUTO: 7.9 % — SIGNIFICANT CHANGE UP (ref 2–14)
NEUTROPHILS # BLD AUTO: 3.72 K/UL — SIGNIFICANT CHANGE UP (ref 1.8–7.4)
NEUTROPHILS NFR BLD AUTO: 53.9 % — SIGNIFICANT CHANGE UP (ref 43–77)
NRBC # FLD: 0 K/UL — SIGNIFICANT CHANGE UP (ref 0–0)
PHOSPHATE SERPL-MCNC: 2.6 MG/DL — SIGNIFICANT CHANGE UP (ref 2.5–4.5)
PLATELET # BLD AUTO: 257 K/UL — SIGNIFICANT CHANGE UP (ref 150–400)
PMV BLD: 10.9 FL — SIGNIFICANT CHANGE UP (ref 7–13)
POTASSIUM SERPL-MCNC: 3.7 MMOL/L — SIGNIFICANT CHANGE UP (ref 3.5–5.3)
POTASSIUM SERPL-SCNC: 3.7 MMOL/L — SIGNIFICANT CHANGE UP (ref 3.5–5.3)
PROT SERPL-MCNC: 7.5 G/DL — SIGNIFICANT CHANGE UP (ref 6–8.3)
RBC # BLD: 4.62 M/UL — SIGNIFICANT CHANGE UP (ref 3.8–5.2)
RBC # FLD: 12.5 % — SIGNIFICANT CHANGE UP (ref 10.3–14.5)
SODIUM SERPL-SCNC: 140 MMOL/L — SIGNIFICANT CHANGE UP (ref 135–145)
T4 AB SER-ACNC: 7.11 UG/DL — SIGNIFICANT CHANGE UP (ref 5.1–13)
TSH SERPL-MCNC: 1.77 UIU/ML — SIGNIFICANT CHANGE UP (ref 0.27–4.2)
WBC # BLD: 6.92 K/UL — SIGNIFICANT CHANGE UP (ref 3.8–10.5)
WBC # FLD AUTO: 6.92 K/UL — SIGNIFICANT CHANGE UP (ref 3.8–10.5)

## 2019-03-27 PROCEDURE — 99213 OFFICE O/P EST LOW 20 MIN: CPT | Mod: GE

## 2019-03-27 RX ORDER — POLYETHYLENE GLYCOL 3350, SODIUM SULFATE ANHYDROUS, SODIUM BICARBONATE, SODIUM CHLORIDE, POTASSIUM CHLORIDE 227.1; 21.5; 6.36; 5.53; .754 G/L; G/L; G/L; G/L; G/L
227.1 POWDER, FOR SOLUTION ORAL
Qty: 1 | Refills: 0 | Status: DISCONTINUED | COMMUNITY
Start: 2019-01-03 | End: 2019-03-27

## 2019-03-28 DIAGNOSIS — E11.69 TYPE 2 DIABETES MELLITUS WITH OTHER SPECIFIED COMPLICATION: ICD-10-CM

## 2019-03-28 LAB
CREAT UR-MCNC: 115 MG/DL — SIGNIFICANT CHANGE UP
MICROALBUMIN UR-MCNC: < 1.2 MG/DL — SIGNIFICANT CHANGE UP

## 2019-03-28 NOTE — ASSESSMENT
[FreeTextEntry1] : Pt is a 63F with PMHx of HTN, DMII, HLD presenting for follow up visit, with recent ED visit for flash in eye and dizziness. \par \par #Eye Flashing/Lightheadedness\par -CTH and ophthalmologic exams negative, without acute pathology \par -negative for concerning sx (headaches, LOC, dizziness, N/V, focal neuro deficits) \par -will check CBC, CMP, Mg, Phos, also for health maintenance \par -will monitor for now\par -continue following with ophtho\par -informed to go to ED immediately if concerning sx, or if sudden loss of vision \par \par #Shaking/Hot Flash\par -endorses sx similar to menopause, afebrile \par -will check TSH, T4\par \par #Breast Mass\par -patient with suspicious breast mass/lymph node on mammography in July 2018; U/S in August demonstrated decrease in size of breast mass and LN so biopsy was not performed \par -patient has not gone for MRI of breast despite two referrals \par -endorsed importance of MRI breast to patient \par -gave another referral today- if no discrete biopsy-able mass is seen on MRI, then will need ultrasound-guided core biopsy of the irregular area of parenchymal tissue deep to the left nipple\par \par #T2DM\par -will continue metformin 500 BID\par -no FS at home given controlled A1c and difficulty with compliance \par -check Hgb A1c today\par \par #HTN\par -continue HCTZ and Amlodipine \par \par RTC in 3 months \par \par Discussed with Dr. Bell \par \par Milena Stapleton, PGY1 \par [employer] [facility]

## 2019-03-28 NOTE — REVIEW OF SYSTEMS
[Hot Flashes] : hot flashes [Vision Problems] : vision problems [Negative] : Heme/Lymph [Fever] : no fever [Fatigue] : no fatigue [Night Sweats] : no night sweats [Recent Change In Weight] : ~T no recent weight change [Discharge] : no discharge [Pain] : no pain [Redness] : no redness [Dryness] : no dryness  [Itching] : no itching [FreeTextEntry3] : flashes of light

## 2019-03-28 NOTE — HISTORY OF PRESENT ILLNESS
[FreeTextEntry1] : follow up visit [de-identified] : Pt is a 63F with PMHx of HTN, DMII, HLD presenting for a follow up visit, with recent ED visit for floaters in eye and lightheadedness. The patient had a negative work up in the ED and followed up with ophthalmology with no acute pathology found. \par \par The patient states she continues to have a flash in her eyes at times, but no loss of vision, dizziness, LOC, or headaches. She states this occurs when she overexerts herself too much with exercise. She states that at times, she gets very hot and shaky, similar to what she experienced during menopause. She denies weight loss, recent travel, fatigue, abdominal pain, urinary symptoms. \par \par She attempts to stay compliant with diabetes diet. She does not take daily fingersticks because she cannot use the machine properly, despite numerous attempts at teaching by pharmacy and diabetes educator. She is compliant with all of her home meds. She had a recent colonoscopy which was clear.

## 2019-04-01 ENCOUNTER — APPOINTMENT (OUTPATIENT)
Dept: OPHTHALMOLOGY | Facility: CLINIC | Age: 64
End: 2019-04-01

## 2019-04-15 ENCOUNTER — APPOINTMENT (OUTPATIENT)
Dept: OPHTHALMOLOGY | Facility: CLINIC | Age: 64
End: 2019-04-15

## 2019-05-30 ENCOUNTER — APPOINTMENT (OUTPATIENT)
Dept: OPHTHALMOLOGY | Facility: CLINIC | Age: 64
End: 2019-05-30

## 2019-07-08 ENCOUNTER — APPOINTMENT (OUTPATIENT)
Dept: MAMMOGRAPHY | Facility: IMAGING CENTER | Age: 64
End: 2019-07-08

## 2019-07-08 ENCOUNTER — OUTPATIENT (OUTPATIENT)
Dept: OUTPATIENT SERVICES | Facility: HOSPITAL | Age: 64
LOS: 1 days | End: 2019-07-08

## 2019-07-08 DIAGNOSIS — Z00.8 ENCOUNTER FOR OTHER GENERAL EXAMINATION: ICD-10-CM

## 2019-07-10 ENCOUNTER — APPOINTMENT (OUTPATIENT)
Dept: MAMMOGRAPHY | Facility: IMAGING CENTER | Age: 64
End: 2019-07-10

## 2019-07-22 ENCOUNTER — APPOINTMENT (OUTPATIENT)
Dept: MRI IMAGING | Facility: IMAGING CENTER | Age: 64
End: 2019-07-22
Payer: MEDICAID

## 2019-08-02 ENCOUNTER — OUTPATIENT (OUTPATIENT)
Dept: OUTPATIENT SERVICES | Facility: HOSPITAL | Age: 64
LOS: 1 days | End: 2019-08-02
Payer: MEDICAID

## 2019-08-02 ENCOUNTER — APPOINTMENT (OUTPATIENT)
Dept: PODIATRY | Facility: HOSPITAL | Age: 64
End: 2019-08-02

## 2019-08-02 VITALS
DIASTOLIC BLOOD PRESSURE: 82 MMHG | WEIGHT: 236 LBS | HEIGHT: 60 IN | RESPIRATION RATE: 14 BRPM | SYSTOLIC BLOOD PRESSURE: 135 MMHG | HEART RATE: 85 BPM | BODY MASS INDEX: 46.33 KG/M2

## 2019-08-02 DIAGNOSIS — M79.609 PAIN IN UNSPECIFIED LIMB: ICD-10-CM

## 2019-08-02 DIAGNOSIS — R60.0 LOCALIZED EDEMA: ICD-10-CM

## 2019-08-02 DIAGNOSIS — E11.40 TYPE 2 DIABETES MELLITUS WITH DIABETIC NEUROPATHY, UNSPECIFIED: ICD-10-CM

## 2019-08-02 PROCEDURE — G0463: CPT

## 2019-08-02 NOTE — ASSESSMENT
[FreeTextEntry1] : 63 yo female patient w/ diabetic neuropathy and presenting for diabetic foot evaluation & b/l leg edema\par - Seen and evaluated\par - Neurovascular status intact and muscle strength intact\par - No open lesions, no acute signs of infection on b/l feet\par - Recommend to refer to PCP for diabetic neuropathy and radiating pain\par - Recommend to wear compression stockings daily for improvement of blood flow and alleviating symptoms\par - Pt RTC in 1 year

## 2019-08-02 NOTE — HISTORY OF PRESENT ILLNESS
[FreeTextEntry1] : 65 yo diabetic female patient presents to clinic for diabetic foot evaluation and complaint of left lower extremity radiating pain coming from the hip. Patient denies any numbness, tingling pain or burning sensation on her feet. Patient denies monitoring her FBS daily and mentions her HbA1c is controlled last time she saw her PCP. Next apt is in 2 months. Patient states she has compression stockings but is not wearing them. Complaints of weakness and fatigue when walking more than 3 blocks. Denies any symptoms of f/c/v/n/sob.

## 2019-08-02 NOTE — PHYSICAL EXAM
[Full Pulse] : the pedal pulses are present [FreeTextEntry1] : Hyperpigmentation at multiple locations on her lower legs suggestive of darker skin/melanine accumulation. No open lesions, erythema or acute signs of infection on her feet.

## 2019-08-06 ENCOUNTER — APPOINTMENT (OUTPATIENT)
Dept: MRI IMAGING | Facility: IMAGING CENTER | Age: 64
End: 2019-08-06
Payer: MEDICAID

## 2019-08-06 ENCOUNTER — OUTPATIENT (OUTPATIENT)
Dept: OUTPATIENT SERVICES | Facility: HOSPITAL | Age: 64
LOS: 1 days | End: 2019-08-06
Payer: MEDICAID

## 2019-08-06 DIAGNOSIS — R92.8 OTHER ABNORMAL AND INCONCLUSIVE FINDINGS ON DIAGNOSTIC IMAGING OF BREAST: ICD-10-CM

## 2019-08-06 PROCEDURE — C8937: CPT

## 2019-08-06 PROCEDURE — 77049 MRI BREAST C-+ W/CAD BI: CPT | Mod: 26

## 2019-08-06 PROCEDURE — A9585: CPT

## 2019-08-06 PROCEDURE — C8908: CPT

## 2019-10-21 ENCOUNTER — LABORATORY RESULT (OUTPATIENT)
Age: 64
End: 2019-10-21

## 2019-10-21 ENCOUNTER — RESULT CHARGE (OUTPATIENT)
Age: 64
End: 2019-10-21

## 2019-10-21 ENCOUNTER — OUTPATIENT (OUTPATIENT)
Dept: OUTPATIENT SERVICES | Facility: HOSPITAL | Age: 64
LOS: 1 days | End: 2019-10-21

## 2019-10-21 ENCOUNTER — APPOINTMENT (OUTPATIENT)
Dept: INTERNAL MEDICINE | Facility: CLINIC | Age: 64
End: 2019-10-21
Payer: MEDICAID

## 2019-10-21 VITALS — DIASTOLIC BLOOD PRESSURE: 72 MMHG | SYSTOLIC BLOOD PRESSURE: 126 MMHG

## 2019-10-21 VITALS — WEIGHT: 237 LBS | BODY MASS INDEX: 46.53 KG/M2 | HEIGHT: 60 IN

## 2019-10-21 LAB — HBA1C BLD-MCNC: 7.2 % — HIGH (ref 4–5.6)

## 2019-10-21 PROCEDURE — 99213 OFFICE O/P EST LOW 20 MIN: CPT | Mod: GE

## 2019-10-21 NOTE — PHYSICAL EXAM
[No Acute Distress] : no acute distress [Normal] : no joint swelling and grossly normal strength and tone [de-identified] : obese

## 2019-10-21 NOTE — HISTORY OF PRESENT ILLNESS
[de-identified] : Pt is a 63F with PMHx of HTN, DMII, HLD presenting for a follow up visit. At this visit, her only complaint is symptoms of dyspepsia and abdominal bloating, mostly associated with intake of milk. The patient states she has become "greedy" and eats too much, after which she feels these symptoms. She has had these symptoms for years and they haven't gotten any worse. She states that when she feels these bloated symptoms, her breasts also swell and feel heavy, which se attributes to bloating in general. She experiences these issues with consumption of milk and cheese. She had an emptying study in 2016 that was negative. \par \par Her diet is not good and she stopped exercising since these episodes of flashing lights. She intends to eat healthier again. \par \par Her issues of dizziness and eye flashing have resolved. [FreeTextEntry1] : follow up

## 2019-10-21 NOTE — ASSESSMENT
[FreeTextEntry1] : Pt is a 63F with PMHx of HTN, DMII, HLD presenting for follow up visit\par \par #Breast Abnormality\par -Hx of mammogram with Stage 4c findings of left breast, however US with bx of LN and MRI without any findings\par "breast swelling" correlates with bloating/dyspepsia that patient feels after eating \par -breast clinic referral sent \par \par #Dyspepsia\par -patient with regurgitation of saliva/food with belching, with bloating after eating large meals; also correlates these symptoms to drinking milk \par -symptoms ongoing for years, with gastric emptying study negative in 2016\par -recommend decreasing food intake and cutting out lactose from diet to see if symptoms improve\par -no concerning symptoms of weight loss, nausea, vomiting, or bloating otherwise\par \par #T2DM\par -check A1c today\par -advised on decreasing food intake and returning to healthy meals with exercise\par -patient endorses that she intends to get back on track \par -sees ophthalmologist and podiatrist with no issues \par \par #HCM\par -next colonoscopy 2029\par -PAP in 2022\par -refusing flu and pneumo \par \par Case discussed with Dr. Freed \par \par RTC in 5 weeks\par \par Milena Stapleton, PGY 2

## 2019-10-21 NOTE — END OF VISIT
[FreeTextEntry3] : 64yoF presents for follow up of abnormal breast imaging in past, abdominal symptoms.\par -Bloating - Patient experiences bloating and dyspepsia when she eats too much, especially with dairy products. She denies any recent weight change, vomiting. Advised to limit dairy in diet and monitor symptoms.\par -Abnormal breast imaging - Patient had BIRADS 4c mammogram last year - follow up US for biopsy was BIRADS2 and LN had decreased in size (biopsy was not performed). US report also noted that left breast edema had resolved. Breast MRI was performed 8/2019 which was BIRADS1 and recommended resuming annual mammography. Due to discrepancies, will refer to breast clinic for complete evaluation (as was recommended in the past as well).\par -DM2 - Check A1c today - patient currently on metformin [] : Resident

## 2019-10-21 NOTE — REVIEW OF SYSTEMS
[Heartburn] : heartburn [Negative] : Psychiatric [Constipation] : no constipation [Nausea] : no nausea [Abdominal Pain] : no abdominal pain [Diarrhea] : diarrhea [Vomiting] : no vomiting [Melena] : no melena [FreeTextEntry7] : belching, bloating, regurgitation

## 2019-10-22 DIAGNOSIS — R92.8 OTHER ABNORMAL AND INCONCLUSIVE FINDINGS ON DIAGNOSTIC IMAGING OF BREAST: ICD-10-CM

## 2019-10-22 DIAGNOSIS — E11.69 TYPE 2 DIABETES MELLITUS WITH OTHER SPECIFIED COMPLICATION: ICD-10-CM

## 2019-10-22 DIAGNOSIS — R10.13 EPIGASTRIC PAIN: ICD-10-CM

## 2019-10-25 LAB — GLUCOSE BLDC GLUCOMTR-MCNC: 151

## 2019-11-04 ENCOUNTER — APPOINTMENT (OUTPATIENT)
Dept: OPHTHALMOLOGY | Facility: CLINIC | Age: 64
End: 2019-11-04

## 2019-11-21 ENCOUNTER — APPOINTMENT (OUTPATIENT)
Dept: SURGICAL ONCOLOGY | Facility: CLINIC | Age: 64
End: 2019-11-21

## 2019-12-12 ENCOUNTER — APPOINTMENT (OUTPATIENT)
Dept: SURGICAL ONCOLOGY | Facility: CLINIC | Age: 64
End: 2019-12-12

## 2020-01-21 NOTE — ED PROVIDER NOTE - OBJECTIVE STATEMENT
63yF with h/o of HTN, diabetes and HLD presents with lightheadedness, and visual hallucinations (mosquitoes) of ~10 month duration. Patient states that she "prays and exercise intensely and oddly" every day, 1.5 hours of standing prayer and 1 hours of standing in place exercise. Evaluated by ophthalmologist 4 days prior without any significant findings. Patient endorses mosquitoes in the room, itching and burning sensation from the mosquitoes but denies chest pain, sob, n/v, abd pain. Patient refuse to see psych and states that she does not have a psychiatric issue coffee

## 2020-03-09 ENCOUNTER — OUTPATIENT (OUTPATIENT)
Dept: OUTPATIENT SERVICES | Facility: HOSPITAL | Age: 65
LOS: 1 days | End: 2020-03-09

## 2020-03-09 ENCOUNTER — LABORATORY RESULT (OUTPATIENT)
Age: 65
End: 2020-03-09

## 2020-03-09 ENCOUNTER — APPOINTMENT (OUTPATIENT)
Dept: INTERNAL MEDICINE | Facility: CLINIC | Age: 65
End: 2020-03-09
Payer: MEDICAID

## 2020-03-09 VITALS
BODY MASS INDEX: 47.51 KG/M2 | HEART RATE: 91 BPM | HEIGHT: 60 IN | WEIGHT: 242 LBS | DIASTOLIC BLOOD PRESSURE: 80 MMHG | OXYGEN SATURATION: 92 % | SYSTOLIC BLOOD PRESSURE: 130 MMHG

## 2020-03-09 VITALS — SYSTOLIC BLOOD PRESSURE: 120 MMHG | DIASTOLIC BLOOD PRESSURE: 70 MMHG

## 2020-03-09 DIAGNOSIS — R10.13 EPIGASTRIC PAIN: ICD-10-CM

## 2020-03-09 DIAGNOSIS — R92.8 OTHER ABNORMAL AND INCONCLUSIVE FINDINGS ON DIAGNOSTIC IMAGING OF BREAST: ICD-10-CM

## 2020-03-09 DIAGNOSIS — E11.40 TYPE 2 DIABETES MELLITUS WITH DIABETIC NEUROPATHY, UNSPECIFIED: ICD-10-CM

## 2020-03-09 LAB
ANION GAP SERPL CALC-SCNC: 12 MMO/L — SIGNIFICANT CHANGE UP (ref 7–14)
BUN SERPL-MCNC: 10 MG/DL — SIGNIFICANT CHANGE UP (ref 7–23)
CALCIUM SERPL-MCNC: 9.8 MG/DL — SIGNIFICANT CHANGE UP (ref 8.4–10.5)
CHLORIDE SERPL-SCNC: 101 MMOL/L — SIGNIFICANT CHANGE UP (ref 98–107)
CHOLEST SERPL-MCNC: 126 MG/DL — SIGNIFICANT CHANGE UP (ref 120–199)
CO2 SERPL-SCNC: 30 MMOL/L — SIGNIFICANT CHANGE UP (ref 22–31)
CREAT SERPL-MCNC: 0.51 MG/DL — SIGNIFICANT CHANGE UP (ref 0.5–1.3)
GLUCOSE SERPL-MCNC: 121 MG/DL — HIGH (ref 70–99)
HBA1C BLD-MCNC: 7.4 % — HIGH (ref 4–5.6)
HDLC SERPL-MCNC: 63 MG/DL — SIGNIFICANT CHANGE UP (ref 45–65)
LIPID PNL WITH DIRECT LDL SERPL: 58 MG/DL — SIGNIFICANT CHANGE UP
MAGNESIUM SERPL-MCNC: 1.9 MG/DL — SIGNIFICANT CHANGE UP (ref 1.6–2.6)
PHOSPHATE SERPL-MCNC: 2.4 MG/DL — LOW (ref 2.5–4.5)
POTASSIUM SERPL-MCNC: 4.6 MMOL/L — SIGNIFICANT CHANGE UP (ref 3.5–5.3)
POTASSIUM SERPL-SCNC: 4.6 MMOL/L — SIGNIFICANT CHANGE UP (ref 3.5–5.3)
SODIUM SERPL-SCNC: 143 MMOL/L — SIGNIFICANT CHANGE UP (ref 135–145)
TRIGL SERPL-MCNC: 53 MG/DL — SIGNIFICANT CHANGE UP (ref 10–149)

## 2020-03-09 PROCEDURE — 99213 OFFICE O/P EST LOW 20 MIN: CPT | Mod: GE

## 2020-03-09 NOTE — HISTORY OF PRESENT ILLNESS
[FreeTextEntry1] : follow up [de-identified] : Pt is a 63F with PMHx of HTN, DMII, HLD presenting for a follow up visit. \par \par Patient continues to endorse bloating, belching and reflux after eating a large portion of rice, meat or milk. She admits that she eats too much and needs to limit how much food she eats in one sitting. The symptoms resolve with prune juice and after belching. Denies nausea, vomiting, weight loss, diarrhea\par \par Diabetes: Endorses eating a significant amount of rice and bread. She understands that she needs to cut down on these items for better diabetes control. No macrovascular or microvascular complications. States she will be scheduling foot and eye exams \par \par In regards to her breast abnormality, she has been unable to see the breast clinic at this time. Denies breast pain, swelling, discharge.

## 2020-03-09 NOTE — ASSESSMENT
[FreeTextEntry1] : Pt is a 63F with PMHx of HTN, DMII, HLD presenting for a follow up visit. \par \par #Dyspepsia\par -likely 2/2 overeating/unhealthy dietary choices \par -advised to avoid lactose and to keep food diary \par -will reassess in 10 weeks to see if dietary changes have helped \par \par #Breast Abnormality \par -breast clinic referral sent and advised patient about importance and what to say when calling to make the appointment. \par -currently asymptomatic \par \par #T2DM\par -send Hgb A1c and microalb/cr today \par -educated on dietary changes \par -patient to schedule foot and eye exams \par \par #HCM\par -colonoscopy 2029\par -pap 2022\par -refusing flu\par -send CBC/BMP/Mg/Ph today \par \par RTC in 10 weeks\par \par Discussed with Dr. Freed\par \par Milena Stapleton, PGY2

## 2020-03-09 NOTE — PHYSICAL EXAM
[Normal] : affect was normal and insight and judgment were intact [de-identified] : obese [de-identified] : bilateral 2+ pitting LE edema

## 2020-03-10 LAB
CREAT UR-MCNC: 63 MG/DL — SIGNIFICANT CHANGE UP
MICROALBUMIN UR-MCNC: 1.6 MG/DL — SIGNIFICANT CHANGE UP
MICROALBUMIN/CREAT UR-RTO: 25 MG/G — SIGNIFICANT CHANGE UP (ref 0–30)

## 2020-05-18 ENCOUNTER — APPOINTMENT (OUTPATIENT)
Dept: INTERNAL MEDICINE | Facility: CLINIC | Age: 65
End: 2020-05-18

## 2020-05-20 ENCOUNTER — APPOINTMENT (OUTPATIENT)
Dept: MAMMOGRAPHY | Facility: IMAGING CENTER | Age: 65
End: 2020-05-20

## 2020-05-20 ENCOUNTER — APPOINTMENT (OUTPATIENT)
Dept: ULTRASOUND IMAGING | Facility: IMAGING CENTER | Age: 65
End: 2020-05-20

## 2020-05-22 ENCOUNTER — OUTPATIENT (OUTPATIENT)
Dept: OUTPATIENT SERVICES | Facility: HOSPITAL | Age: 65
LOS: 1 days | End: 2020-05-22

## 2020-05-22 ENCOUNTER — APPOINTMENT (OUTPATIENT)
Dept: INTERNAL MEDICINE | Facility: CLINIC | Age: 65
End: 2020-05-22
Payer: MEDICAID

## 2020-05-22 VITALS — BODY MASS INDEX: 47.51 KG/M2 | HEIGHT: 60 IN | WEIGHT: 242 LBS

## 2020-05-22 PROCEDURE — ZZZZZ: CPT

## 2020-05-22 NOTE — PHYSICAL EXAM
[No Acute Distress] : no acute distress [No Respiratory Distress] : no respiratory distress  [Speech Grossly Normal] : speech grossly normal [Memory Grossly Normal] : memory grossly normal [Normal Affect] : the affect was normal [Alert and Oriented x3] : oriented to person, place, and time [Normal Mood] : the mood was normal

## 2020-06-22 NOTE — ASSESSMENT
[FreeTextEntry1] : 63F with PMHx of HTN, DMII, HLD presenting for a follow up visit. Increased metformin 1000 BID, sent referral for mammogram, and encouraged weight loss and to change dietary habits to prevent food reflux\par Health care maintenance- repeat mammogram, colonoscopy 2029, pap 2022\par \par RTC in 3-4 months\par Case discussed with Dr. Morgan\par Maikel Tipton, [employer]

## 2020-07-24 ENCOUNTER — APPOINTMENT (OUTPATIENT)
Dept: MAMMOGRAPHY | Facility: IMAGING CENTER | Age: 65
End: 2020-07-24
Payer: MEDICAID

## 2020-07-24 ENCOUNTER — APPOINTMENT (OUTPATIENT)
Dept: ULTRASOUND IMAGING | Facility: IMAGING CENTER | Age: 65
End: 2020-07-24
Payer: MEDICAID

## 2020-07-24 PROCEDURE — 77067 SCR MAMMO BI INCL CAD: CPT | Mod: 26

## 2020-07-24 PROCEDURE — 77063 BREAST TOMOSYNTHESIS BI: CPT | Mod: 26

## 2020-08-12 ENCOUNTER — APPOINTMENT (OUTPATIENT)
Dept: INTERNAL MEDICINE | Facility: CLINIC | Age: 65
End: 2020-08-12

## 2020-09-02 ENCOUNTER — APPOINTMENT (OUTPATIENT)
Dept: INTERNAL MEDICINE | Facility: CLINIC | Age: 65
End: 2020-09-02
Payer: MEDICAID

## 2020-09-02 ENCOUNTER — OUTPATIENT (OUTPATIENT)
Dept: OUTPATIENT SERVICES | Facility: HOSPITAL | Age: 65
LOS: 1 days | End: 2020-09-02

## 2020-09-02 VITALS
HEIGHT: 60 IN | HEART RATE: 95 BPM | DIASTOLIC BLOOD PRESSURE: 80 MMHG | BODY MASS INDEX: 47.51 KG/M2 | OXYGEN SATURATION: 98 % | WEIGHT: 242 LBS | RESPIRATION RATE: 15 BRPM | SYSTOLIC BLOOD PRESSURE: 130 MMHG

## 2020-09-02 DIAGNOSIS — Z23 ENCOUNTER FOR IMMUNIZATION: ICD-10-CM

## 2020-09-02 DIAGNOSIS — G62.9 POLYNEUROPATHY, UNSPECIFIED: ICD-10-CM

## 2020-09-02 PROCEDURE — 99213 OFFICE O/P EST LOW 20 MIN: CPT | Mod: GE

## 2020-09-03 LAB
GLUCOSE BLDC GLUCOMTR-MCNC: 88
HBA1C MFR BLD HPLC: 6.5

## 2020-09-10 ENCOUNTER — APPOINTMENT (OUTPATIENT)
Dept: ORTHOPEDIC SURGERY | Facility: CLINIC | Age: 65
End: 2020-09-10
Payer: MEDICAID

## 2020-09-10 VITALS — WEIGHT: 230 LBS | HEIGHT: 60 IN | BODY MASS INDEX: 45.16 KG/M2

## 2020-09-10 PROCEDURE — 73590 X-RAY EXAM OF LOWER LEG: CPT | Mod: RT

## 2020-09-10 PROCEDURE — 73630 X-RAY EXAM OF FOOT: CPT | Mod: RT

## 2020-09-10 PROCEDURE — 99203 OFFICE O/P NEW LOW 30 MIN: CPT

## 2020-09-14 NOTE — END OF VISIT
[FreeTextEntry3] : Patient discussed with resident. Reports occasional muscle cramping, burning/tingling after exercise over last few months--> has also increased physical activity. No gross abnormalities on exam, sensation intact, 2+ peripheral pulses and no wounds--> monofilament unavailable. Due for podiatry testing. Consider gabapentin for neuropathic pain if persistent symptoms. \par Patient also due for breast imaging--> abnormal imaging 2 years prior on mammo; MRI normal in 2019, repeat mammo this year. Flu shot today.\par HCM up to date.  [] : Resident

## 2020-09-14 NOTE — HISTORY OF PRESENT ILLNESS
[FreeTextEntry1] : Follow up  [de-identified] : Pt is a 63F with PMHx of HTN, DMII, HLD presenting for a follow up visit. \par \par Patient endorsing significant burning/cramping in lower legs, associated with working out at times. She says the burning limits her walking and activity. Other than the lower limb complaints, the patient states she feels well. In regards to her diabetes, she states she has improved her diet, and thus has lost 12 pounds. She does not take fingersticks, but is compliant with Metformin. She needs to see a podiatrist and ophthalmologist.\par \par In regards to her breast abnormality, she states she had a mammography in July 2020, however no record is seen in the chart. Her MRI in 2019 was limited, however was grossly normal. She no longer has breast symptoms, including swelling, pain or discharge. \par

## 2020-09-14 NOTE — PHYSICAL EXAM
[No Acute Distress] : no acute distress [Well Nourished] : well nourished [Normal Sclera/Conjunctiva] : normal sclera/conjunctiva [PERRL] : pupils equal round and reactive to light [Normal Outer Ear/Nose] : the outer ears and nose were normal in appearance [No JVD] : no jugular venous distention [No Lymphadenopathy] : no lymphadenopathy [Supple] : supple [No Respiratory Distress] : no respiratory distress  [No Accessory Muscle Use] : no accessory muscle use [Clear to Auscultation] : lungs were clear to auscultation bilaterally [Regular Rhythm] : with a regular rhythm [Normal S1, S2] : normal S1 and S2 [Normal Rate] : normal rate  [No Murmur] : no murmur heard [Soft] : abdomen soft [No Edema] : there was no peripheral edema [Non-distended] : non-distended [Non Tender] : non-tender [No Masses] : no abdominal mass palpated [No HSM] : no HSM [Normal Bowel Sounds] : normal bowel sounds [Normal Posterior Cervical Nodes] : no posterior cervical lymphadenopathy [Normal Anterior Cervical Nodes] : no anterior cervical lymphadenopathy [No CVA Tenderness] : no CVA  tenderness [No Spinal Tenderness] : no spinal tenderness [No Joint Swelling] : no joint swelling [Grossly Normal Strength/Tone] : grossly normal strength/tone [Coordination Grossly Intact] : coordination grossly intact [No Rash] : no rash [No Focal Deficits] : no focal deficits [Normal Gait] : normal gait [de-identified] : obese

## 2020-09-14 NOTE — ASSESSMENT
[FreeTextEntry1] : Pt is a 63F with PMHx of HTN, DMII, HLD presenting for a follow up visit. \par \par #Neuropathy\par -burning/tingling in ankles and feet likely 2/2 diabetic neuropathy. Foot exam in clinic wnl today, no wounds. Pulses 3+ and equal\par -gave podiatry referral as patient is due for this year\par -recommend compression stockings\par -referral for diabetic shoes given \par -advised to never walk barefoot and call the office if any problems with the feet, or significant lower leg cramping with activity \par -will consider low dose Gabapentin pending podiatry evaluation\par \par #T2DM\par -POC A1c 6.5%, well controlled\par -continue Metformin 1000 BID, continue ASA for primary prevention\par -continue diabetic diet\par -referral for podiatry and ophthalmology given \par \par #Breast Abnormality\par -patient will reach out to have results of mammography sent over\par -no active symptoms \par \par #HCM\par -Cervical screening: due 2024\par -Mammography: due 2021\par -Colonoscopy: Due 2029\par -Give flu\par -No labs due today \par \par RTC in 10 weeks.\par Time spent with patient: 30 mins\par \par Discussed with Dr. Jimenez.

## 2020-09-15 DIAGNOSIS — R92.8 OTHER ABNORMAL AND INCONCLUSIVE FINDINGS ON DIAGNOSTIC IMAGING OF BREAST: ICD-10-CM

## 2020-09-15 DIAGNOSIS — Z00.00 ENCOUNTER FOR GENERAL ADULT MEDICAL EXAMINATION WITHOUT ABNORMAL FINDINGS: ICD-10-CM

## 2020-09-15 DIAGNOSIS — Z23 ENCOUNTER FOR IMMUNIZATION: ICD-10-CM

## 2020-09-15 DIAGNOSIS — E11.9 TYPE 2 DIABETES MELLITUS WITHOUT COMPLICATIONS: ICD-10-CM

## 2020-09-15 DIAGNOSIS — E11.40 TYPE 2 DIABETES MELLITUS WITH DIABETIC NEUROPATHY, UNSPECIFIED: ICD-10-CM

## 2020-09-15 DIAGNOSIS — G62.9 POLYNEUROPATHY, UNSPECIFIED: ICD-10-CM

## 2020-11-11 ENCOUNTER — APPOINTMENT (OUTPATIENT)
Dept: INTERNAL MEDICINE | Facility: CLINIC | Age: 65
End: 2020-11-11
Payer: MEDICAID

## 2020-11-11 ENCOUNTER — RESULT CHARGE (OUTPATIENT)
Age: 65
End: 2020-11-11

## 2020-11-11 ENCOUNTER — OUTPATIENT (OUTPATIENT)
Dept: OUTPATIENT SERVICES | Facility: HOSPITAL | Age: 65
LOS: 1 days | End: 2020-11-11

## 2020-11-11 VITALS
HEIGHT: 60 IN | OXYGEN SATURATION: 96 % | BODY MASS INDEX: 45.16 KG/M2 | WEIGHT: 230 LBS | SYSTOLIC BLOOD PRESSURE: 134 MMHG | DIASTOLIC BLOOD PRESSURE: 76 MMHG | HEART RATE: 92 BPM | RESPIRATION RATE: 18 BRPM

## 2020-11-11 VITALS — TEMPERATURE: 98.3 F

## 2020-11-11 DIAGNOSIS — M79.672 PAIN IN RIGHT FOOT: ICD-10-CM

## 2020-11-11 DIAGNOSIS — M79.671 PAIN IN RIGHT FOOT: ICD-10-CM

## 2020-11-11 PROCEDURE — 99213 OFFICE O/P EST LOW 20 MIN: CPT | Mod: GE

## 2020-11-12 DIAGNOSIS — R92.8 OTHER ABNORMAL AND INCONCLUSIVE FINDINGS ON DIAGNOSTIC IMAGING OF BREAST: ICD-10-CM

## 2020-11-12 DIAGNOSIS — I10 ESSENTIAL (PRIMARY) HYPERTENSION: ICD-10-CM

## 2020-11-12 DIAGNOSIS — M79.671 PAIN IN RIGHT FOOT: ICD-10-CM

## 2020-11-12 DIAGNOSIS — E11.69 TYPE 2 DIABETES MELLITUS WITH OTHER SPECIFIED COMPLICATION: ICD-10-CM

## 2020-11-12 LAB — GLUCOSE BLDC GLUCOMTR-MCNC: 91

## 2020-11-12 NOTE — END OF VISIT
[] : Resident [FreeTextEntry3] : 63F with PMHx of HTN, DMII, HLD presenting for a follow up visit. \par \par #DM2\par A1c 6.5 on metformin \par \par #HTN\par well controlled c/w regimen \par \par #Abnormal mammogram\par Hx of Birads 4 c mammogram w/ mass, but not visualized when attempted to biopsy\par Repeat mammogram this year reported to be normal, but results not in system\par Pt told to have family member bring in results\par If unable to obtain, will redo mammogram next visit \par \par #HCM\par UTD [Time Spent: ___ minutes] : I have spent [unfilled] minutes of time on the encounter. [>50% of the face to face encounter time was spent on counseling and/or coordination of care for ___] : Greater than 50% of the face to face encounter time was spent on counseling and/or coordination of care for [unfilled]

## 2020-11-12 NOTE — REVIEW OF SYSTEMS
[Negative] : Neurological [Abdominal Pain] : no abdominal pain [Nausea] : no nausea [Constipation] : no constipation [Diarrhea] : diarrhea [Vomiting] : no vomiting [Heartburn] : no heartburn [Melena] : no melena [FreeTextEntry7] : bloating when overeating

## 2020-11-12 NOTE — PHYSICAL EXAM
[Normal Sclera/Conjunctiva] : normal sclera/conjunctiva [Normal Outer Ear/Nose] : the outer ears and nose were normal in appearance [No JVD] : no jugular venous distention [No Lymphadenopathy] : no lymphadenopathy [No Respiratory Distress] : no respiratory distress  [No Accessory Muscle Use] : no accessory muscle use [Normal Rate] : normal rate  [Regular Rhythm] : with a regular rhythm [Normal S1, S2] : normal S1 and S2 [No Murmur] : no murmur heard [No Edema] : there was no peripheral edema [No Joint Swelling] : no joint swelling [No Rash] : no rash [Comprehensive Foot Exam Normal] : Right and left foot were examined and both feet are normal. No ulcers in either foot. Toes are normal and with full ROM.  Normal tactile sensation with monofilament testing throughout both feet [de-identified] : obese

## 2020-11-12 NOTE — HISTORY OF PRESENT ILLNESS
[FreeTextEntry1] : Follow up visit [de-identified] : Pt is a 63F with PMHx of HTN, DMII, HLD presenting for a follow up visit. \par \par She feels well since last visit. Her foot issues are resolving after buying Crocs. She also saw orthopedics with no new intervention. She does endorse dietary indiscretion and not controlling her diet well. She is motivated to eat better. \par \par In regards to her breast abnormality, she states she had a mammography in July 2020, however no record is seen in the chart. Her MRI in 2019 was limited, however was grossly normal. She no longer has breast symptoms, including swelling, pain or discharge. She still needs to fax over a report. \par

## 2020-11-12 NOTE — ASSESSMENT
[FreeTextEntry1] : Pt is a 63F with PMHx of HTN, DMII, HLD presenting for a follow up visit. \par \par #Neuropathy\par -now improved after buying Crocs. Patient without wounds and good pulses\par -will continue to monitor\par -needs to follow up with podiatry\par \par #T2DM\par -POC A1c 6.5%, well controlled\par -continue Metformin 1000 BID, continue ASA for primary prevention\par -continue diabetic diet. Encouraged making better dietary choices, and portion control\par -referral for ophthalmology given \par \par #Breast Abnormality\par -patient will reach out to have results of mammography sent over. Advised that if results are not in our system by next visit, will give another referral for repeat mammography\par -no active symptoms \par \par #HCM\par -Cervical screening: due 2024\par -Mammography: due 2021\par -Colonoscopy: Due 2029\par -Given flu\par -No labs due today \par \par RTC in 3 months\par \par Discussed with Dr. Monroe

## 2020-11-25 ENCOUNTER — RX RENEWAL (OUTPATIENT)
Age: 65
End: 2020-11-25

## 2021-01-05 ENCOUNTER — APPOINTMENT (OUTPATIENT)
Dept: ORTHOPEDIC SURGERY | Facility: CLINIC | Age: 66
End: 2021-01-05

## 2021-01-19 ENCOUNTER — RX RENEWAL (OUTPATIENT)
Age: 66
End: 2021-01-19

## 2021-01-23 ENCOUNTER — APPOINTMENT (OUTPATIENT)
Dept: ORTHOPEDIC SURGERY | Facility: CLINIC | Age: 66
End: 2021-01-23
Payer: MEDICAID

## 2021-01-23 PROCEDURE — 99072 ADDL SUPL MATRL&STAF TM PHE: CPT

## 2021-01-23 PROCEDURE — 99213 OFFICE O/P EST LOW 20 MIN: CPT

## 2021-01-24 NOTE — HISTORY OF PRESENT ILLNESS
[FreeTextEntry1] : 65 year female presents for evaluation of b/l foot pain (R=L) x 1 yr with mild amelioration since last visit. Pt denies any recent injuries to b/l foot/ankles. Pt also states generalized around b/l LE below the knees including legs and thighs, as well as c/o lower back pain. Pt states the pain is intermittent and currently have no pain today. Pt takes Advil for pain. Pt denies any numbness/tingling/limping.  BMI 45.

## 2021-01-24 NOTE — PHYSICAL EXAM
[Normal] : Oriented to person, place, and time, insight and judgement were intact and the affect was normal [de-identified] : Extremity: +Equinus (releases) B LE, +PPAV B feet, resolving tenderness plantar aspect B forefoot and ST B hindfoot, good range of motion B ankles, 50% ST ROM B hindfoot. Nontender B pretibial, ankles, peroneals, syndesmosis Achilles, midfoot LF and PTT insertional, and remainder of forefoot. Stable Drawer testing B ankles, 5 / 5 evertor and invertor strength B ankles, 2+ B DP pulse, skin intact B LE. AOx3, mood / affect normal.  [de-identified] : GOVIND, NAD

## 2021-01-24 NOTE — DISCUSSION/SUMMARY
[de-identified] : Options reviewed, NB shoe wear, activity modification, physical therapy / rehabilitation and associated modalities, calf stretching exercises and HEP, referral Dr. Mosley for assessment lumbago with B lower extremity discomfort.  Return to office in 2 - 3 months / PRN, all questions answered.

## 2021-02-24 ENCOUNTER — APPOINTMENT (OUTPATIENT)
Dept: INTERNAL MEDICINE | Facility: CLINIC | Age: 66
End: 2021-02-24
Payer: MEDICAID

## 2021-02-24 ENCOUNTER — OUTPATIENT (OUTPATIENT)
Dept: OUTPATIENT SERVICES | Facility: HOSPITAL | Age: 66
LOS: 1 days | End: 2021-02-24

## 2021-02-24 VITALS
HEIGHT: 60 IN | DIASTOLIC BLOOD PRESSURE: 74 MMHG | BODY MASS INDEX: 46.33 KG/M2 | OXYGEN SATURATION: 96 % | WEIGHT: 236 LBS | SYSTOLIC BLOOD PRESSURE: 128 MMHG | RESPIRATION RATE: 16 BRPM | HEART RATE: 80 BPM

## 2021-02-24 VITALS — TEMPERATURE: 97.3 F

## 2021-02-24 PROCEDURE — 99213 OFFICE O/P EST LOW 20 MIN: CPT | Mod: GE

## 2021-02-26 LAB — HBA1C MFR BLD HPLC: 6.8

## 2021-03-05 DIAGNOSIS — M54.30 SCIATICA, UNSPECIFIED SIDE: ICD-10-CM

## 2021-03-05 DIAGNOSIS — E11.40 TYPE 2 DIABETES MELLITUS WITH DIABETIC NEUROPATHY, UNSPECIFIED: ICD-10-CM

## 2021-03-05 DIAGNOSIS — Z00.00 ENCOUNTER FOR GENERAL ADULT MEDICAL EXAMINATION WITHOUT ABNORMAL FINDINGS: ICD-10-CM

## 2021-03-05 NOTE — REVIEW OF SYSTEMS
[Negative] : Heme/Lymph [FreeTextEntry9] : b/l foot pain [de-identified] : tingling pain down right leg

## 2021-03-05 NOTE — PHYSICAL EXAM
[No Acute Distress] : no acute distress [Normal Sclera/Conjunctiva] : normal sclera/conjunctiva [PERRL] : pupils equal round and reactive to light [EOMI] : extraocular movements intact [Normal Outer Ear/Nose] : the outer ears and nose were normal in appearance [Normal Oropharynx] : the oropharynx was normal [No JVD] : no jugular venous distention [No Lymphadenopathy] : no lymphadenopathy [Supple] : supple [Thyroid Normal, No Nodules] : the thyroid was normal and there were no nodules present [No Respiratory Distress] : no respiratory distress  [No Accessory Muscle Use] : no accessory muscle use [Clear to Auscultation] : lungs were clear to auscultation bilaterally [Normal Rate] : normal rate  [Regular Rhythm] : with a regular rhythm [Normal S1, S2] : normal S1 and S2 [No Murmur] : no murmur heard [No Carotid Bruits] : no carotid bruits [No Abdominal Bruit] : a ~M bruit was not heard ~T in the abdomen [No Varicosities] : no varicosities [Pedal Pulses Present] : the pedal pulses are present [No Edema] : there was no peripheral edema [No Palpable Aorta] : no palpable aorta [No Extremity Clubbing/Cyanosis] : no extremity clubbing/cyanosis [Soft] : abdomen soft [Non Tender] : non-tender [Non-distended] : non-distended [No Masses] : no abdominal mass palpated [No HSM] : no HSM [Normal Bowel Sounds] : normal bowel sounds [Normal Posterior Cervical Nodes] : no posterior cervical lymphadenopathy [Normal Anterior Cervical Nodes] : no anterior cervical lymphadenopathy [No CVA Tenderness] : no CVA  tenderness [No Spinal Tenderness] : no spinal tenderness [No Joint Swelling] : no joint swelling [Grossly Normal Strength/Tone] : grossly normal strength/tone [No Rash] : no rash [Coordination Grossly Intact] : coordination grossly intact [No Focal Deficits] : no focal deficits [Normal Gait] : normal gait [Deep Tendon Reflexes (DTR)] : deep tendon reflexes were 2+ and symmetric [Normal Affect] : the affect was normal [Normal Insight/Judgement] : insight and judgment were intact [de-identified] : obese

## 2021-03-05 NOTE — HISTORY OF PRESENT ILLNESS
[FreeTextEntry1] : Follow up [de-identified] : Pt is a 65F with PMHx of HTN, DMII, HLD presenting for a follow up visit. \par \par Interval history: Had a visit with ortho for b/l foot pain, with recommendations for exercise, PT and appropriate shoes. \par \par Diabetes - A1c has been well controlled. She states her diet has improved and she eats whole wheat bread and brown rice. Has decreased portions. Denies snacking, soda, or juice. Has not seen ophthalmology yet\par \par Sciatic Pain/Foot Pain - has not been able to go to PT yet. Does limit her ability to exercise\par

## 2021-04-19 ENCOUNTER — RX RENEWAL (OUTPATIENT)
Age: 66
End: 2021-04-19

## 2021-05-17 ENCOUNTER — RX RENEWAL (OUTPATIENT)
Age: 66
End: 2021-05-17

## 2021-06-28 ENCOUNTER — RX RENEWAL (OUTPATIENT)
Age: 66
End: 2021-06-28

## 2021-07-26 ENCOUNTER — OUTPATIENT (OUTPATIENT)
Dept: OUTPATIENT SERVICES | Facility: HOSPITAL | Age: 66
LOS: 1 days | End: 2021-07-26
Payer: MEDICAID

## 2021-07-26 ENCOUNTER — RESULT REVIEW (OUTPATIENT)
Age: 66
End: 2021-07-26

## 2021-07-26 ENCOUNTER — APPOINTMENT (OUTPATIENT)
Dept: MAMMOGRAPHY | Facility: IMAGING CENTER | Age: 66
End: 2021-07-26
Payer: MEDICAID

## 2021-07-26 DIAGNOSIS — Z00.00 ENCOUNTER FOR GENERAL ADULT MEDICAL EXAMINATION WITHOUT ABNORMAL FINDINGS: ICD-10-CM

## 2021-07-26 PROCEDURE — 77063 BREAST TOMOSYNTHESIS BI: CPT | Mod: 26

## 2021-07-26 PROCEDURE — 77067 SCR MAMMO BI INCL CAD: CPT | Mod: 26

## 2021-07-26 PROCEDURE — 77067 SCR MAMMO BI INCL CAD: CPT

## 2021-07-26 PROCEDURE — 77063 BREAST TOMOSYNTHESIS BI: CPT

## 2021-08-19 ENCOUNTER — RESULT REVIEW (OUTPATIENT)
Age: 66
End: 2021-08-19

## 2021-08-19 ENCOUNTER — OUTPATIENT (OUTPATIENT)
Dept: OUTPATIENT SERVICES | Facility: HOSPITAL | Age: 66
LOS: 1 days | End: 2021-08-19

## 2021-08-19 ENCOUNTER — APPOINTMENT (OUTPATIENT)
Dept: INTERNAL MEDICINE | Facility: CLINIC | Age: 66
End: 2021-08-19
Payer: MEDICAID

## 2021-08-19 VITALS
BODY MASS INDEX: 43.06 KG/M2 | OXYGEN SATURATION: 95 % | HEART RATE: 90 BPM | HEIGHT: 62 IN | DIASTOLIC BLOOD PRESSURE: 80 MMHG | SYSTOLIC BLOOD PRESSURE: 110 MMHG | WEIGHT: 234 LBS

## 2021-08-19 VITALS — TEMPERATURE: 97.6 F

## 2021-08-19 LAB
ALBUMIN SERPL ELPH-MCNC: 4.6 G/DL — SIGNIFICANT CHANGE UP (ref 3.3–5)
ALP SERPL-CCNC: 86 U/L — SIGNIFICANT CHANGE UP (ref 40–120)
ALT FLD-CCNC: 21 U/L — SIGNIFICANT CHANGE UP (ref 4–33)
ANION GAP SERPL CALC-SCNC: 16 MMOL/L — HIGH (ref 7–14)
AST SERPL-CCNC: 23 U/L — SIGNIFICANT CHANGE UP (ref 4–32)
BASOPHILS # BLD AUTO: 0.02 K/UL — SIGNIFICANT CHANGE UP (ref 0–0.2)
BASOPHILS NFR BLD AUTO: 0.3 % — SIGNIFICANT CHANGE UP (ref 0–2)
BILIRUB SERPL-MCNC: 0.6 MG/DL — SIGNIFICANT CHANGE UP (ref 0.2–1.2)
BUN SERPL-MCNC: 13 MG/DL — SIGNIFICANT CHANGE UP (ref 7–23)
CALCIUM SERPL-MCNC: 9.8 MG/DL — SIGNIFICANT CHANGE UP (ref 8.4–10.5)
CHLORIDE SERPL-SCNC: 104 MMOL/L — SIGNIFICANT CHANGE UP (ref 98–107)
CO2 SERPL-SCNC: 25 MMOL/L — SIGNIFICANT CHANGE UP (ref 22–31)
CREAT SERPL-MCNC: 0.46 MG/DL — LOW (ref 0.5–1.3)
CRP SERPL-MCNC: 7.1 MG/L — HIGH
EOSINOPHIL # BLD AUTO: 0.21 K/UL — SIGNIFICANT CHANGE UP (ref 0–0.5)
EOSINOPHIL NFR BLD AUTO: 3.5 % — SIGNIFICANT CHANGE UP (ref 0–6)
ERYTHROCYTE [SEDIMENTATION RATE] IN BLOOD: 7 MM/HR — SIGNIFICANT CHANGE UP (ref 4–25)
GLUCOSE SERPL-MCNC: 90 MG/DL — SIGNIFICANT CHANGE UP (ref 70–99)
HBA1C MFR BLD HPLC: 6.9
HCT VFR BLD CALC: 41.1 % — SIGNIFICANT CHANGE UP (ref 34.5–45)
HGB BLD-MCNC: 13.2 G/DL — SIGNIFICANT CHANGE UP (ref 11.5–15.5)
IANC: 2.99 K/UL — SIGNIFICANT CHANGE UP (ref 1.5–8.5)
IMM GRANULOCYTES NFR BLD AUTO: 0.2 % — SIGNIFICANT CHANGE UP (ref 0–1.5)
LYMPHOCYTES # BLD AUTO: 2.3 K/UL — SIGNIFICANT CHANGE UP (ref 1–3.3)
LYMPHOCYTES # BLD AUTO: 38 % — SIGNIFICANT CHANGE UP (ref 13–44)
MCHC RBC-ENTMCNC: 30.8 PG — SIGNIFICANT CHANGE UP (ref 27–34)
MCHC RBC-ENTMCNC: 32.1 GM/DL — SIGNIFICANT CHANGE UP (ref 32–36)
MCV RBC AUTO: 95.8 FL — SIGNIFICANT CHANGE UP (ref 80–100)
MONOCYTES # BLD AUTO: 0.52 K/UL — SIGNIFICANT CHANGE UP (ref 0–0.9)
MONOCYTES NFR BLD AUTO: 8.6 % — SIGNIFICANT CHANGE UP (ref 2–14)
NEUTROPHILS # BLD AUTO: 2.99 K/UL — SIGNIFICANT CHANGE UP (ref 1.8–7.4)
NEUTROPHILS NFR BLD AUTO: 49.4 % — SIGNIFICANT CHANGE UP (ref 43–77)
NRBC # BLD: 0 /100 WBCS — SIGNIFICANT CHANGE UP
NRBC # FLD: 0 K/UL — SIGNIFICANT CHANGE UP
PLATELET # BLD AUTO: 211 K/UL — SIGNIFICANT CHANGE UP (ref 150–400)
POTASSIUM SERPL-MCNC: 3.6 MMOL/L — SIGNIFICANT CHANGE UP (ref 3.5–5.3)
POTASSIUM SERPL-SCNC: 3.6 MMOL/L — SIGNIFICANT CHANGE UP (ref 3.5–5.3)
PROT SERPL-MCNC: 7.6 G/DL — SIGNIFICANT CHANGE UP (ref 6–8.3)
RBC # BLD: 4.29 M/UL — SIGNIFICANT CHANGE UP (ref 3.8–5.2)
RBC # FLD: 13.1 % — SIGNIFICANT CHANGE UP (ref 10.3–14.5)
SODIUM SERPL-SCNC: 145 MMOL/L — SIGNIFICANT CHANGE UP (ref 135–145)
WBC # BLD: 6.05 K/UL — SIGNIFICANT CHANGE UP (ref 3.8–10.5)
WBC # FLD AUTO: 6.05 K/UL — SIGNIFICANT CHANGE UP (ref 3.8–10.5)

## 2021-08-19 PROCEDURE — 99214 OFFICE O/P EST MOD 30 MIN: CPT | Mod: GC

## 2021-08-20 NOTE — PHYSICAL EXAM
[No Acute Distress] : no acute distress [Well Nourished] : well nourished [Well Developed] : well developed [Well-Appearing] : well-appearing [Normal Sclera/Conjunctiva] : normal sclera/conjunctiva [PERRL] : pupils equal round and reactive to light [EOMI] : extraocular movements intact [Normal Outer Ear/Nose] : the outer ears and nose were normal in appearance [Normal Oropharynx] : the oropharynx was normal [No JVD] : no jugular venous distention [No Lymphadenopathy] : no lymphadenopathy [Supple] : supple [No Respiratory Distress] : no respiratory distress  [No Accessory Muscle Use] : no accessory muscle use [Clear to Auscultation] : lungs were clear to auscultation bilaterally [Normal Rate] : normal rate  [Regular Rhythm] : with a regular rhythm [Normal S1, S2] : normal S1 and S2 [No Murmur] : no murmur heard [Pedal Pulses Present] : the pedal pulses are present [No Edema] : there was no peripheral edema [No Extremity Clubbing/Cyanosis] : no extremity clubbing/cyanosis [Soft] : abdomen soft [Non Tender] : non-tender [Non-distended] : non-distended [No Masses] : no abdominal mass palpated [Normal Posterior Cervical Nodes] : no posterior cervical lymphadenopathy [Normal Anterior Cervical Nodes] : no anterior cervical lymphadenopathy [No Spinal Tenderness] : no spinal tenderness [No Joint Swelling] : no joint swelling [Grossly Normal Strength/Tone] : grossly normal strength/tone [No Rash] : no rash [Coordination Grossly Intact] : coordination grossly intact [Normal Gait] : normal gait [No Focal Deficits] : no focal deficits [Normal Affect] : the affect was normal [Normal Insight/Judgement] : insight and judgment were intact [Comprehensive Foot Exam Normal] : Right and left foot were examined and both feet are normal. No ulcers in either foot. Toes are normal and with full ROM.  Normal tactile sensation with monofilament testing throughout both feet [de-identified] : mild R lumbar paraspinal tenderness to palpation [de-identified] : mild anterior R knee tenderness, full painless active/passive ROM of all extremity joints, no joint swelling, no redness

## 2021-08-20 NOTE — HISTORY OF PRESENT ILLNESS
[FreeTextEntry1] : Follow-up, Joint pain [de-identified] : 65 y/o F with PMHx of HTN, DMII, HLD presenting for a follow up visit.\par \par Pt reports intermittent b/l LE pain for >1 year, mostly in the ankles and feet described as mild to moderate stabbing and/or burning pain without particular provoking factors. Does also note intermittent pains in other joints during this time, including in the b/l wrists, elbows, and shoulders. She states she takes advil or tylenol ~1-2x/week for this pain with improvement. More recently over the past several months pt has noted episodes of sharp R knee pain that is exacerbated by walking and weight bearing, also improves with advil. She denies any recent injuries/trauma, numbness, tingling, swelling, redness, or other skin changes.\par \par Pt otherwise denies any recent fevers/chills, HA, neck pain, chest pain, SOB, cough, abd pain, n/v/d, urinary sxs, or other complaints at this time. She has been compliant with home medications. Notes she has continued a diet with decreased portions, more complex carbs, and limited snacking, soda, or juice.

## 2021-08-20 NOTE — ASSESSMENT
[FreeTextEntry1] : 65 y/o F with PMHx of HTN, DMII, HLD presenting for a follow up visit and c/o joint pain. \par \par #R knee pain, joint pains - most likely knee osteoarthritis given clinical course and risk factors, however inflammatory joint dz also possible v referred pain from R hip, no recent injuries\par -Xray R knee and R hip with weight-bearing views\par -Will obtain inflammatory markers ESR/CRP\par -Pt deferred PT at this time due to ongoing pandemic\par -Discussed exercises that can be done at home\par -NSAIDs and Acetaminophen as needed\par -Follows with orthopedics\par \par #T2DM\par -Well controlled on Metformin 1000 BID\par -A1c 6.9% today (6.8% 2/24/21, 6.5% 9/2/20)\par -Continue ongoing dietary modification, which patient has made\par -Needs ophthalmology follow up, referral given\par -DM foot exam normal today\par -Repeat A1c at next visit\par \par #HTN\par -BP controlled with HCTZ, Amlodipine\par \par #HLD\par - LDL 58, HDL 63, TG 53 March 2020\par - c/w atorvastatin 20mg qD\par - Repeat lipid panel next year\par \par #HCM\par -Pap/HPV negative 2017, next due 2022\par -Mammography birads 2 July 2021, next due 2022\par -Colonoscopy 2019, next due 2029\par -Flu vaccine September 2020\par -PPSV (2015), Tdap (2015), Zostavax (2018)\par -Moderna Covid Vaccine June 2021\par \par RTC in 6 months\par \par Shawn Jiang\par EM/IM PGY4\par \par Case d/w Dr. Marc

## 2021-08-20 NOTE — END OF VISIT
[] : Resident [FreeTextEntry3] : Knee likely almost certainly OA, x-ray to assess severity. Other fleeting joint symptoms may possibly be inflammatory, although pt a bit older age to first develop these. For this would check inflammatory markers. Other per resident plan.

## 2021-08-20 NOTE — REVIEW OF SYSTEMS
[Joint Pain] : joint pain [Back Pain] : back pain [Fever] : no fever [Chills] : no chills [Recent Change In Weight] : ~T no recent weight change [Pain] : no pain [Vision Problems] : no vision problems [Hoarseness] : no hoarseness [Sore Throat] : no sore throat [Chest Pain] : no chest pain [Palpitations] : no palpitations [Lower Ext Edema] : no lower extremity edema [Shortness Of Breath] : no shortness of breath [Wheezing] : no wheezing [Cough] : no cough [Abdominal Pain] : no abdominal pain [Nausea] : no nausea [Diarrhea] : diarrhea [Vomiting] : no vomiting [Dysuria] : no dysuria [Hematuria] : no hematuria [Joint Stiffness] : no joint stiffness [Joint Swelling] : no joint swelling [Muscle Weakness] : no muscle weakness [Itching] : no itching [Skin Rash] : no skin rash [Headache] : no headache [Dizziness] : no dizziness [Fainting] : no fainting [Anxiety] : no anxiety [Depression] : no depression [Easy Bleeding] : no easy bleeding [Easy Bruising] : no easy bruising

## 2021-08-21 LAB
CREAT ?TM UR-MCNC: 110 MG/DL — SIGNIFICANT CHANGE UP
MICROALBUMIN UR-MCNC: <1.2 MG/DL — SIGNIFICANT CHANGE UP
MICROALBUMIN/CREAT UR-RTO: SIGNIFICANT CHANGE UP MG/G (ref 0–30)

## 2021-08-23 ENCOUNTER — NON-APPOINTMENT (OUTPATIENT)
Age: 66
End: 2021-08-23

## 2021-08-24 DIAGNOSIS — M25.561 PAIN IN RIGHT KNEE: ICD-10-CM

## 2021-08-24 DIAGNOSIS — E11.69 TYPE 2 DIABETES MELLITUS WITH OTHER SPECIFIED COMPLICATION: ICD-10-CM

## 2021-08-24 DIAGNOSIS — E11.40 TYPE 2 DIABETES MELLITUS WITH DIABETIC NEUROPATHY, UNSPECIFIED: ICD-10-CM

## 2021-08-24 DIAGNOSIS — E78.5 HYPERLIPIDEMIA, UNSPECIFIED: ICD-10-CM

## 2021-08-24 DIAGNOSIS — I10 ESSENTIAL (PRIMARY) HYPERTENSION: ICD-10-CM

## 2021-08-25 ENCOUNTER — OUTPATIENT (OUTPATIENT)
Dept: OUTPATIENT SERVICES | Facility: HOSPITAL | Age: 66
LOS: 1 days | End: 2021-08-25

## 2021-08-25 ENCOUNTER — APPOINTMENT (OUTPATIENT)
Dept: INTERNAL MEDICINE | Facility: CLINIC | Age: 66
End: 2021-08-25
Payer: MEDICAID

## 2021-08-25 ENCOUNTER — APPOINTMENT (OUTPATIENT)
Dept: INTERNAL MEDICINE | Facility: CLINIC | Age: 66
End: 2021-08-25

## 2021-08-25 VITALS
BODY MASS INDEX: 42.88 KG/M2 | DIASTOLIC BLOOD PRESSURE: 80 MMHG | HEART RATE: 89 BPM | SYSTOLIC BLOOD PRESSURE: 120 MMHG | OXYGEN SATURATION: 96 % | WEIGHT: 233 LBS | HEIGHT: 62 IN

## 2021-08-25 VITALS — TEMPERATURE: 98.8 F

## 2021-08-25 DIAGNOSIS — M25.561 PAIN IN RIGHT KNEE: ICD-10-CM

## 2021-08-25 PROCEDURE — 99213 OFFICE O/P EST LOW 20 MIN: CPT | Mod: GE

## 2021-08-30 LAB — GLUCOSE BLDC GLUCOMTR-MCNC: 115

## 2021-08-31 DIAGNOSIS — M25.561 PAIN IN RIGHT KNEE: ICD-10-CM

## 2021-08-31 DIAGNOSIS — M25.531 PAIN IN RIGHT WRIST: ICD-10-CM

## 2021-08-31 DIAGNOSIS — E11.69 TYPE 2 DIABETES MELLITUS WITH OTHER SPECIFIED COMPLICATION: ICD-10-CM

## 2021-08-31 DIAGNOSIS — R14.0 ABDOMINAL DISTENSION (GASEOUS): ICD-10-CM

## 2021-08-31 NOTE — REVIEW OF SYSTEMS
[Abdominal Pain] : abdominal pain [Heartburn] : heartburn [Joint Pain] : joint pain [Joint Stiffness] : joint stiffness [Muscle Pain] : muscle pain [Fever] : no fever [Chills] : no chills [Fatigue] : no fatigue [Hot Flashes] : no hot flashes [Night Sweats] : no night sweats [Recent Change In Weight] : ~T no recent weight change [Sore Throat] : no sore throat [Chest Pain] : no chest pain [Palpitations] : no palpitations [Shortness Of Breath] : no shortness of breath [Cough] : no cough [Nausea] : no nausea [Diarrhea] : diarrhea [Vomiting] : no vomiting [Melena] : no melena [Joint Swelling] : no joint swelling [Muscle Weakness] : no muscle weakness [Skin Rash] : no skin rash [Dizziness] : no dizziness [Fainting] : no fainting

## 2021-08-31 NOTE — PHYSICAL EXAM
[No Acute Distress] : no acute distress [Well-Appearing] : well-appearing [No Respiratory Distress] : no respiratory distress  [No Accessory Muscle Use] : no accessory muscle use [Clear to Auscultation] : lungs were clear to auscultation bilaterally [Normal Rate] : normal rate  [Regular Rhythm] : with a regular rhythm [Normal S1, S2] : normal S1 and S2 [No Murmur] : no murmur heard [Pedal Pulses Present] : the pedal pulses are present [Soft] : abdomen soft [Non Tender] : non-tender [No Rash] : no rash [de-identified] : obese [de-identified] : Slight swelling of R knee, otherwise no joint swelling; no joint pain to manipulation; ROM intact; 5/5 strength BL upper and lower extremities; slight crepitus of R shoulder on arm raise

## 2021-08-31 NOTE — HISTORY OF PRESENT ILLNESS
[FreeTextEntry1] : Ms. Burr is a 66yoF with PMH of HTN, DMII, HLD who is presenting for follow up for joint pain with chief complaint of "My labs said I might have a blood infection and I was supposed to come back if I kept having pain." [de-identified] : Ms. Burr is presenting wishing to follow up on her labs from prior visit. She received a call to discuss her lab results and had the impression that the labs suggested she might have an infection; she states she was told to come back to clinic if she kept having pain. Upon chart review, it appears she was informed of her elevated CRP and was told to return if she had worsening pain, fever, or chills. She is not having any of those symptoms nor is she having worsening pain. Labs were reviewed and explained in detail during the visit.\par \par #Pain\par Endorses joint pain in:BL shoulders, BL wrists, BL knees R>L, BL ankle R>L. The pain is least in the morning and worsens over the day. She typically does not have pain at rest, and pain improves at rest. No joint swelling, no warmth or erythema, not tender to palpations. She takes ibuprofen once or twice a week. She has tried tylenol but feels it does not help. She previously went to PT and it helped, but is not interested in restarting at this time because she does not have time to attend appointments.\par XR R hip and R knee were ordered on prior visit but she has not been able to have them done yet.\par \par #Abdominal Bloating and Discomfort\par Endorses abdominal bloating and discomfort that always occurs after eating "anything", also sometimes occurs without apparent food trigger. Discomfort improves with bm or passing flatus. She is having both loose and formed stools every other day, taking prune juice q1week ad OTC laxative q3days (does not recall name). Denies n/v.\par Diet: breakfast coffee and biscuit, lunch/dinner: rice and pumpkin and fish. Only eating 2 meals per day because she feels full. \par Does not take blood sugar at home because she is not able to understand how to use her glucometer. She states that she has been taught in office four times and still is not able to do it herself. She does not want to receive teaching today. She denies hypoglycemic symptoms such as fatigue, dizziness, shakiness, diaphoresis, or anxiety.\par

## 2021-08-31 NOTE — ASSESSMENT
[FreeTextEntry1] : #Polyarticular joint pain\par Likely OA given worsening pain throughout day with use and not associated with joint swelling or warmth. CRP mildly elevated to 7.1 but given clinical picture less likely RA. Would be careful with NSAIDs in this patient with T2DM, counseled patient, however her current use of once or twice a week is less concerning. She was counseled that she does not appear to have any infection and reassurance was provided.\par - Try pain management with tylenol - can take 2 325mg pills or take 1 extra strength\par - XR BL wrist ordered\par - Return for regular general follow up, has an appt scheduled 2/2022

## 2021-09-21 ENCOUNTER — RX RENEWAL (OUTPATIENT)
Age: 66
End: 2021-09-21

## 2021-10-18 ENCOUNTER — RX RENEWAL (OUTPATIENT)
Age: 66
End: 2021-10-18

## 2022-01-04 ENCOUNTER — NON-APPOINTMENT (OUTPATIENT)
Age: 67
End: 2022-01-04

## 2022-01-06 ENCOUNTER — NON-APPOINTMENT (OUTPATIENT)
Age: 67
End: 2022-01-06

## 2022-01-06 ENCOUNTER — APPOINTMENT (OUTPATIENT)
Dept: OPHTHALMOLOGY | Facility: CLINIC | Age: 67
End: 2022-01-06
Payer: MEDICAID

## 2022-01-06 PROCEDURE — 92004 COMPRE OPH EXAM NEW PT 1/>: CPT

## 2022-01-06 PROCEDURE — 92133 CPTRZD OPH DX IMG PST SGM ON: CPT

## 2022-02-10 ENCOUNTER — APPOINTMENT (OUTPATIENT)
Dept: INTERNAL MEDICINE | Facility: CLINIC | Age: 67
End: 2022-02-10
Payer: MEDICAID

## 2022-02-10 ENCOUNTER — RESULT REVIEW (OUTPATIENT)
Age: 67
End: 2022-02-10

## 2022-02-10 ENCOUNTER — OUTPATIENT (OUTPATIENT)
Dept: OUTPATIENT SERVICES | Facility: HOSPITAL | Age: 67
LOS: 1 days | End: 2022-02-10

## 2022-02-10 VITALS
BODY MASS INDEX: 43.06 KG/M2 | SYSTOLIC BLOOD PRESSURE: 118 MMHG | DIASTOLIC BLOOD PRESSURE: 86 MMHG | OXYGEN SATURATION: 97 % | HEART RATE: 83 BPM | HEIGHT: 62 IN | WEIGHT: 234 LBS

## 2022-02-10 VITALS — TEMPERATURE: 97.9 F

## 2022-02-10 DIAGNOSIS — E11.40 TYPE 2 DIABETES MELLITUS WITH DIABETIC NEUROPATHY, UNSPECIFIED: ICD-10-CM

## 2022-02-10 DIAGNOSIS — E78.5 HYPERLIPIDEMIA, UNSPECIFIED: ICD-10-CM

## 2022-02-10 DIAGNOSIS — I10 ESSENTIAL (PRIMARY) HYPERTENSION: ICD-10-CM

## 2022-02-10 LAB
A1C WITH ESTIMATED AVERAGE GLUCOSE RESULT: 6.8 % — HIGH (ref 4–5.6)
ALBUMIN SERPL ELPH-MCNC: 4.9 G/DL — SIGNIFICANT CHANGE UP (ref 3.3–5)
ALP SERPL-CCNC: 92 U/L — SIGNIFICANT CHANGE UP (ref 40–120)
ALT FLD-CCNC: 27 U/L — SIGNIFICANT CHANGE UP (ref 4–33)
ANION GAP SERPL CALC-SCNC: 13 MMOL/L — SIGNIFICANT CHANGE UP (ref 7–14)
AST SERPL-CCNC: 27 U/L — SIGNIFICANT CHANGE UP (ref 4–32)
BASOPHILS # BLD AUTO: 0.02 K/UL — SIGNIFICANT CHANGE UP (ref 0–0.2)
BASOPHILS NFR BLD AUTO: 0.3 % — SIGNIFICANT CHANGE UP (ref 0–2)
BILIRUB SERPL-MCNC: 0.5 MG/DL — SIGNIFICANT CHANGE UP (ref 0.2–1.2)
BUN SERPL-MCNC: 14 MG/DL — SIGNIFICANT CHANGE UP (ref 7–23)
CALCIUM SERPL-MCNC: 10.2 MG/DL — SIGNIFICANT CHANGE UP (ref 8.4–10.5)
CHLORIDE SERPL-SCNC: 104 MMOL/L — SIGNIFICANT CHANGE UP (ref 98–107)
CO2 SERPL-SCNC: 27 MMOL/L — SIGNIFICANT CHANGE UP (ref 22–31)
CREAT ?TM UR-MCNC: 92 MG/DL — SIGNIFICANT CHANGE UP
CREAT SERPL-MCNC: 0.58 MG/DL — SIGNIFICANT CHANGE UP (ref 0.5–1.3)
EOSINOPHIL # BLD AUTO: 0.31 K/UL — SIGNIFICANT CHANGE UP (ref 0–0.5)
EOSINOPHIL NFR BLD AUTO: 5.2 % — SIGNIFICANT CHANGE UP (ref 0–6)
ESTIMATED AVERAGE GLUCOSE: 148 — SIGNIFICANT CHANGE UP
GLUCOSE SERPL-MCNC: 104 MG/DL — HIGH (ref 70–99)
HCT VFR BLD CALC: 42.7 % — SIGNIFICANT CHANGE UP (ref 34.5–45)
HGB BLD-MCNC: 13.8 G/DL — SIGNIFICANT CHANGE UP (ref 11.5–15.5)
IANC: 3.42 K/UL — SIGNIFICANT CHANGE UP (ref 1.5–8.5)
IMM GRANULOCYTES NFR BLD AUTO: 0.3 % — SIGNIFICANT CHANGE UP (ref 0–1.5)
LYMPHOCYTES # BLD AUTO: 1.66 K/UL — SIGNIFICANT CHANGE UP (ref 1–3.3)
LYMPHOCYTES # BLD AUTO: 27.6 % — SIGNIFICANT CHANGE UP (ref 13–44)
MCHC RBC-ENTMCNC: 30.7 PG — SIGNIFICANT CHANGE UP (ref 27–34)
MCHC RBC-ENTMCNC: 32.3 GM/DL — SIGNIFICANT CHANGE UP (ref 32–36)
MCV RBC AUTO: 94.9 FL — SIGNIFICANT CHANGE UP (ref 80–100)
MONOCYTES # BLD AUTO: 0.58 K/UL — SIGNIFICANT CHANGE UP (ref 0–0.9)
MONOCYTES NFR BLD AUTO: 9.7 % — SIGNIFICANT CHANGE UP (ref 2–14)
NEUTROPHILS # BLD AUTO: 3.42 K/UL — SIGNIFICANT CHANGE UP (ref 1.8–7.4)
NEUTROPHILS NFR BLD AUTO: 56.9 % — SIGNIFICANT CHANGE UP (ref 43–77)
NRBC # BLD: 0 /100 WBCS — SIGNIFICANT CHANGE UP
NRBC # FLD: 0 K/UL — SIGNIFICANT CHANGE UP
PLATELET # BLD AUTO: 251 K/UL — SIGNIFICANT CHANGE UP (ref 150–400)
POTASSIUM SERPL-MCNC: 4 MMOL/L — SIGNIFICANT CHANGE UP (ref 3.5–5.3)
POTASSIUM SERPL-SCNC: 4 MMOL/L — SIGNIFICANT CHANGE UP (ref 3.5–5.3)
PROT ?TM UR-MCNC: 10 MG/DL — SIGNIFICANT CHANGE UP
PROT SERPL-MCNC: 7.6 G/DL — SIGNIFICANT CHANGE UP (ref 6–8.3)
PROT/CREAT UR-RTO: 0.1 RATIO — SIGNIFICANT CHANGE UP (ref 0–0.2)
RBC # BLD: 4.5 M/UL — SIGNIFICANT CHANGE UP (ref 3.8–5.2)
RBC # FLD: 13.3 % — SIGNIFICANT CHANGE UP (ref 10.3–14.5)
SODIUM SERPL-SCNC: 144 MMOL/L — SIGNIFICANT CHANGE UP (ref 135–145)
WBC # BLD: 6.01 K/UL — SIGNIFICANT CHANGE UP (ref 3.8–10.5)
WBC # FLD AUTO: 6.01 K/UL — SIGNIFICANT CHANGE UP (ref 3.8–10.5)

## 2022-02-10 PROCEDURE — 99214 OFFICE O/P EST MOD 30 MIN: CPT | Mod: GC

## 2022-02-10 RX ORDER — HYDROCORTISONE 25 MG/G
2.5 CREAM TOPICAL TWICE DAILY
Qty: 1 | Refills: 0 | Status: ACTIVE | COMMUNITY
Start: 2022-02-10 | End: 1900-01-01

## 2022-02-11 DIAGNOSIS — E11.40 TYPE 2 DIABETES MELLITUS WITH DIABETIC NEUROPATHY, UNSPECIFIED: ICD-10-CM

## 2022-02-11 DIAGNOSIS — R21 RASH AND OTHER NONSPECIFIC SKIN ERUPTION: ICD-10-CM

## 2022-02-11 DIAGNOSIS — I10 ESSENTIAL (PRIMARY) HYPERTENSION: ICD-10-CM

## 2022-02-11 DIAGNOSIS — E78.5 HYPERLIPIDEMIA, UNSPECIFIED: ICD-10-CM

## 2022-02-11 NOTE — HISTORY OF PRESENT ILLNESS
[FreeTextEntry1] : f/u chronic conditions and foot rash [de-identified] : 66yoF with PMH of HTN, DMII, HLD presenting for f/u chronic conditions and foot rash. \par \par #Right Foot rash\par started recently on right foot was initially hyperpigmented and itchy w/ blisters, patient scratched it causing release of watery fluid. has tried fungal cream without improvement, saw vascular doctors who state there is some issue with her veins. + burning sensation in bilateral feet. Has put some purple colored cream, patient unclear of name onto the foot which patient states help with itchiness and burning sensation.\par \par Denies fevers, chills, CP, abdominal pain, joint pains have resolved on prior visit. No urinary or bowel issues.

## 2022-02-11 NOTE — ASSESSMENT
[FreeTextEntry1] : 66yoF with PMH of HTN, DMII, HLD presenting for foot rash concerning for diabetic foot ulcer vs bullous pemphigoid vs cellulitis vs fungal infxn\par \par RTC in 10 days for reassessment of rash\par Case discussed with Dr. Culp\par Maikel Tipton, [employer] [facility]

## 2022-02-11 NOTE — PHYSICAL EXAM
[No Acute Distress] : no acute distress [Well-Appearing] : well-appearing [No Respiratory Distress] : no respiratory distress  [No Accessory Muscle Use] : no accessory muscle use [Clear to Auscultation] : lungs were clear to auscultation bilaterally [Normal Rate] : normal rate  [Regular Rhythm] : with a regular rhythm [Normal S1, S2] : normal S1 and S2 [No Murmur] : no murmur heard [Pedal Pulses Present] : the pedal pulses are present [Soft] : abdomen soft [Non Tender] : non-tender [de-identified] : obese [de-identified] : right foot hyperpigmentation noted over dorsum of foot, left foot dorsum noted purple cream application, 2 small superficial ulcers with surrounding erythema. Non-tender to palpation. Unclear if underlying erythema of skin which is obscured by the purple ointment

## 2022-02-11 NOTE — REVIEW OF SYSTEMS
[Negative] : Musculoskeletal [Fever] : no fever [Chills] : no chills [Fatigue] : no fatigue [Hot Flashes] : no hot flashes [Night Sweats] : no night sweats [Recent Change In Weight] : ~T no recent weight change [Sore Throat] : no sore throat [Chest Pain] : no chest pain [Palpitations] : no palpitations [Shortness Of Breath] : no shortness of breath [Cough] : no cough [Abdominal Pain] : no abdominal pain [Nausea] : no nausea [Diarrhea] : diarrhea [Vomiting] : no vomiting [Melena] : no melena [Skin Rash] : no skin rash [Dizziness] : no dizziness [Fainting] : no fainting

## 2022-02-22 ENCOUNTER — APPOINTMENT (OUTPATIENT)
Dept: INTERNAL MEDICINE | Facility: CLINIC | Age: 67
End: 2022-02-22
Payer: MEDICAID

## 2022-02-22 ENCOUNTER — OUTPATIENT (OUTPATIENT)
Dept: OUTPATIENT SERVICES | Facility: HOSPITAL | Age: 67
LOS: 1 days | End: 2022-02-22

## 2022-02-22 PROCEDURE — 99213 OFFICE O/P EST LOW 20 MIN: CPT | Mod: GE

## 2022-02-25 DIAGNOSIS — R21 RASH AND OTHER NONSPECIFIC SKIN ERUPTION: ICD-10-CM

## 2022-02-25 NOTE — PHYSICAL EXAM
[Normal] : no carotid or abdominal bruits heard, no varicosities, pedal pulses are present, no peripheral edema, no extremity clubbing or cyanosis and no palpable aorta [de-identified] : Left foot with some discoloration of the distal aspects of her foot. Calluses present with some dermal thickening. Pulses 2+ throughout. No other skin breaks noted on exam. Sensation is intact throughout.

## 2022-02-25 NOTE — PLAN
[FreeTextEntry1] : 66yoF with PMH of HTN, DMII, HLD presenting for f/u of her left foot rash\par \par Foot rash\par - Unclear etiology; possible venous insufficiency? however, not in the typical areas of medial malleolus. Currently well appearing and healing well\par - Advised the patient that 10 days of doxycycline is enough and no need for further antibiotic treatment\par - Also advised to stop steroid treatment at this time as itching has resolved.\par - Pt will f/u with dermatology and vascular\par - F/U in clinic in 6 months\par \par Case discussed with Dr. Haley\par \par Travis Braden\par EM/IM\par PGY-4

## 2022-02-25 NOTE — HISTORY OF PRESENT ILLNESS
[FreeTextEntry1] : Follow up of the foot [de-identified] : 66yoF with PMH of HTN, DMII, HLD presenting for f/u of left foot rash. As per the previous note, the patient was having itchy and hyperpigmented left foot that she excoriated by scratching too much. She was seen in the clinic and started on doxycycline and steroid cream to help with the itchiness and possible superinfection. She is here today regarding follow up of her foot. Since she left, she endorses that the itchiness has resolved and her foot feels much better. No bleeding, discharge, pain or other major changes.

## 2022-04-01 ENCOUNTER — OUTPATIENT (OUTPATIENT)
Dept: OUTPATIENT SERVICES | Facility: HOSPITAL | Age: 67
LOS: 1 days | End: 2022-04-01

## 2022-04-01 ENCOUNTER — APPOINTMENT (OUTPATIENT)
Dept: DERMATOLOGY | Facility: CLINIC | Age: 67
End: 2022-04-01
Payer: MEDICAID

## 2022-04-01 VITALS
WEIGHT: 232.13 LBS | SYSTOLIC BLOOD PRESSURE: 130 MMHG | OXYGEN SATURATION: 95 % | RESPIRATION RATE: 18 BRPM | BODY MASS INDEX: 42.72 KG/M2 | DIASTOLIC BLOOD PRESSURE: 76 MMHG | HEIGHT: 62 IN | HEART RATE: 86 BPM

## 2022-04-01 DIAGNOSIS — L30.9 DERMATITIS, UNSPECIFIED: ICD-10-CM

## 2022-04-01 DIAGNOSIS — L81.0 POSTINFLAMMATORY HYPERPIGMENTATION: ICD-10-CM

## 2022-04-01 DIAGNOSIS — B35.3 TINEA PEDIS: ICD-10-CM

## 2022-04-01 PROCEDURE — 99203 OFFICE O/P NEW LOW 30 MIN: CPT

## 2022-05-09 ENCOUNTER — RX RENEWAL (OUTPATIENT)
Age: 67
End: 2022-05-09

## 2022-05-10 ENCOUNTER — NON-APPOINTMENT (OUTPATIENT)
Age: 67
End: 2022-05-10

## 2022-05-10 ENCOUNTER — APPOINTMENT (OUTPATIENT)
Dept: OPHTHALMOLOGY | Facility: CLINIC | Age: 67
End: 2022-05-10
Payer: MEDICAID

## 2022-05-10 PROCEDURE — 92285 EXTERNAL OCULAR PHOTOGRAPHY: CPT

## 2022-05-10 PROCEDURE — 92012 INTRM OPH EXAM EST PATIENT: CPT

## 2022-05-10 PROCEDURE — 92025 CPTRIZED CORNEAL TOPOGRAPHY: CPT

## 2022-05-17 ENCOUNTER — RX RENEWAL (OUTPATIENT)
Age: 67
End: 2022-05-17

## 2022-06-06 ENCOUNTER — RX RENEWAL (OUTPATIENT)
Age: 67
End: 2022-06-06

## 2022-07-25 ENCOUNTER — APPOINTMENT (OUTPATIENT)
Dept: OPHTHALMOLOGY | Facility: AMBULATORY MEDICAL SERVICES | Age: 67
End: 2022-07-25

## 2022-07-26 ENCOUNTER — APPOINTMENT (OUTPATIENT)
Dept: OPHTHALMOLOGY | Facility: CLINIC | Age: 67
End: 2022-07-26

## 2022-08-02 ENCOUNTER — APPOINTMENT (OUTPATIENT)
Dept: OPHTHALMOLOGY | Facility: CLINIC | Age: 67
End: 2022-08-02

## 2022-08-22 ENCOUNTER — RX RENEWAL (OUTPATIENT)
Age: 67
End: 2022-08-22

## 2022-08-26 ENCOUNTER — APPOINTMENT (OUTPATIENT)
Dept: INTERNAL MEDICINE | Facility: CLINIC | Age: 67
End: 2022-08-26

## 2022-08-26 ENCOUNTER — OUTPATIENT (OUTPATIENT)
Dept: OUTPATIENT SERVICES | Facility: HOSPITAL | Age: 67
LOS: 1 days | End: 2022-08-26

## 2022-08-26 VITALS
SYSTOLIC BLOOD PRESSURE: 128 MMHG | HEIGHT: 62 IN | DIASTOLIC BLOOD PRESSURE: 76 MMHG | BODY MASS INDEX: 41.41 KG/M2 | WEIGHT: 225 LBS | OXYGEN SATURATION: 95 % | RESPIRATION RATE: 22 BRPM | HEART RATE: 86 BPM | TEMPERATURE: 97.3 F

## 2022-08-26 DIAGNOSIS — Z00.00 ENCOUNTER FOR GENERAL ADULT MEDICAL EXAMINATION W/OUT ABNORMAL FINDINGS: ICD-10-CM

## 2022-08-26 DIAGNOSIS — E11.40 TYPE 2 DIABETES MELLITUS WITH DIABETIC NEUROPATHY, UNSPECIFIED: ICD-10-CM

## 2022-08-26 PROCEDURE — 99214 OFFICE O/P EST MOD 30 MIN: CPT | Mod: GC

## 2022-08-27 PROBLEM — E11.40 CONTROLLED TYPE 2 DIABETES WITH NEUROPATHY: Status: ACTIVE | Noted: 2019-08-02

## 2022-08-27 RX ORDER — TERBINAFINE HYDROCHLORIDE 1 G/100G
1 CREAM TOPICAL
Qty: 1 | Refills: 2 | Status: ACTIVE | COMMUNITY
Start: 2022-02-10 | End: 1900-01-01

## 2022-08-27 RX ORDER — ATORVASTATIN CALCIUM 20 MG/1
20 TABLET, FILM COATED ORAL
Qty: 90 | Refills: 3 | Status: ACTIVE | COMMUNITY
Start: 2018-07-27 | End: 1900-01-01

## 2022-08-27 RX ORDER — SIMETHICONE 125 MG
125 CAPSULE ORAL
Qty: 90 | Refills: 0 | Status: DISCONTINUED | COMMUNITY
Start: 2021-08-25 | End: 2022-08-27

## 2022-08-27 RX ORDER — ASPIRIN ENTERIC COATED TABLETS 81 MG 81 MG/1
81 TABLET, DELAYED RELEASE ORAL
Qty: 90 | Refills: 1 | Status: DISCONTINUED | COMMUNITY
Start: 2020-09-02 | End: 2022-08-27

## 2022-08-27 RX ORDER — POLYETHYLENE GLYCOL 3350 17 G/17G
17 POWDER, FOR SOLUTION ORAL DAILY
Qty: 30 | Refills: 2 | Status: ACTIVE | COMMUNITY
Start: 2022-08-27 | End: 1900-01-01

## 2022-08-27 RX ORDER — DOXYCYCLINE HYCLATE 100 MG/1
100 CAPSULE ORAL
Qty: 28 | Refills: 0 | Status: DISCONTINUED | COMMUNITY
Start: 2022-02-10 | End: 2022-08-27

## 2022-08-27 RX ORDER — TRIAMCINOLONE ACETONIDE 1 MG/G
0.1 OINTMENT TOPICAL
Qty: 1 | Refills: 2 | Status: ACTIVE | COMMUNITY
Start: 2022-04-01 | End: 1900-01-01

## 2022-08-27 RX ORDER — SIMETHICONE 80 MG/1
80 TABLET, CHEWABLE ORAL
Qty: 270 | Refills: 0 | Status: ACTIVE | COMMUNITY
Start: 2022-08-27 | End: 1900-01-01

## 2022-08-29 LAB
ALBUMIN SERPL ELPH-MCNC: 4.9 G/DL
ALP BLD-CCNC: 88 U/L
ALT SERPL-CCNC: 24 U/L
ANION GAP SERPL CALC-SCNC: 14 MMOL/L
AST SERPL-CCNC: 21 U/L
BILIRUB SERPL-MCNC: 0.8 MG/DL
BUN SERPL-MCNC: 13 MG/DL
CALCIUM SERPL-MCNC: 10.4 MG/DL
CHLORIDE SERPL-SCNC: 103 MMOL/L
CHOLEST SERPL-MCNC: 140 MG/DL
CO2 SERPL-SCNC: 26 MMOL/L
CREAT SERPL-MCNC: 0.48 MG/DL
CREAT SPEC-SCNC: 85 MG/DL
EGFR: 105 ML/MIN/1.73M2
ESTIMATED AVERAGE GLUCOSE: 154 MG/DL
GLUCOSE BLDC GLUCOMTR-MCNC: 114
GLUCOSE SERPL-MCNC: 113 MG/DL
HBA1C MFR BLD HPLC: 7 %
HDLC SERPL-MCNC: 62 MG/DL
LDLC SERPL CALC-MCNC: 63 MG/DL
MICROALBUMIN 24H UR DL<=1MG/L-MCNC: <1.2 MG/DL
MICROALBUMIN/CREAT 24H UR-RTO: NORMAL MG/G
NONHDLC SERPL-MCNC: 78 MG/DL
POTASSIUM SERPL-SCNC: 4 MMOL/L
PROT SERPL-MCNC: 7.6 G/DL
SODIUM SERPL-SCNC: 144 MMOL/L
TRIGL SERPL-MCNC: 77 MG/DL

## 2022-08-30 ENCOUNTER — APPOINTMENT (OUTPATIENT)
Dept: OPHTHALMOLOGY | Facility: CLINIC | Age: 67
End: 2022-08-30

## 2022-08-30 DIAGNOSIS — B35.3 TINEA PEDIS: ICD-10-CM

## 2022-08-30 DIAGNOSIS — K59.00 CONSTIPATION, UNSPECIFIED: ICD-10-CM

## 2022-08-30 DIAGNOSIS — Z00.00 ENCOUNTER FOR GENERAL ADULT MEDICAL EXAMINATION WITHOUT ABNORMAL FINDINGS: ICD-10-CM

## 2022-08-30 DIAGNOSIS — E66.01 MORBID (SEVERE) OBESITY DUE TO EXCESS CALORIES: ICD-10-CM

## 2022-08-30 DIAGNOSIS — E11.69 TYPE 2 DIABETES MELLITUS WITH OTHER SPECIFIED COMPLICATION: ICD-10-CM

## 2022-08-30 DIAGNOSIS — R10.13 EPIGASTRIC PAIN: ICD-10-CM

## 2022-08-30 DIAGNOSIS — E11.40 TYPE 2 DIABETES MELLITUS WITH DIABETIC NEUROPATHY, UNSPECIFIED: ICD-10-CM

## 2022-08-30 NOTE — ASSESSMENT
[FreeTextEntry1] : Ms. Landaverde is a 65 y/o female with PMHx of HTN, DMII, HLD, and tinea pedis who presents for follow up chronic conditions.\par \par #Type II diabetes\par - last A1C 6.8\par - currently on metformin 1000mg BID\par - discussed plan to introduce GLP-1 like ozempic to assist with diabetes control and weight loss; patient preferred to keep trying lifestyle modifications and metformin at this time, would likely require PO ozempic in the future since patient may have difficulty administering injectable medication\par - repeat A1C today, POCT glucose 114 in office check\par - check urine albumin/Cr \par \par #Dyspepsia\par - reports gassiness that builds up and associated constipation\par - constipation may be underlying feelings of gassiness, so will opt to optimize constipation; patient also reports history of EGD which she was told showed slowed digestion\par - recommended miralax and simethicone for symptomatic management\par - encouraged hydration for constipation\par - if no improvement in symptoms, consider repeat EGD to evaluate for worsening gastroparesis vs symptomatic H. Pylori\par \par #HTN\par - /76\par - well controlled on amlodipine 5mg and hctz 25mg; has mild lower extremity swelling, likely from amlodipine, which she manages with compression socks\par \par #HLD\par - currently on atorvastatin 20mg qhs\par - repeat lipid panel\par - consider adjusting statin to low-intensity if levels are stable/improved\par \par #Class III obesity\par - BMI > 40\par - lost 7 lbs since last visit 4 months ago\par - discussed introduction of GLP-1 like ozempic but patient prefers lifestyle modification at this time\par - deferred weight management clinic referral because patient has limited transportation for multiple visits\par \par #Tinea pedis, improved\par - continue with terbinafine\par - continue with steroid ointment for concomitant eczema per Dermatology\par - if either worsens, consider re-eval by Dermatology\par \par #HCM\par Flu: up to date\par COVID: vaccinated x2, has deferred others due to fear of side effects\par TDAP: last 4/2015\par PPSV23: last 4/2015\par Shingles: Zostavax 2018\par Colorectal cancer: colonoscopy 2019 wnl, repeat in 10 years\par HIV: negative 2017\par Hepatitis C: negative 2015\par Cervical Cancer: last > 5 years per patient, given OB/GYN referral for pap smear; likely last if everything is normal\par Breast Cancer: patient reports mammogram last year wnl, repeat in 1 year\par Osteoporosis: consider ordering DEXA scan at next visit, patient now meets age criteria\par \par \par Discussed case with Dr. Morgan.\par \par RTC in 3 months for possible addition of ozempic, weight management, f/u abdominal symptoms, review labs, and f/u HCM. \par \par Anthony Figueroa, PGY-2\par Internal Medicine\par [employer] [facility] \par \par \par \par \par

## 2022-08-30 NOTE — HISTORY OF PRESENT ILLNESS
[FreeTextEntry1] : Follow up [de-identified] : Ms. Landaverde is a 67 y/o F with PMHx of HTN, DMII, HLD, and tinea pedis who is presenting for follow up chronic conditions.\par \par In the interim, the patient's foot rash has improved. She has been applying hydrocortisone ointment and terbinafine per Dermatology recommendations from recent visit. She reports hyperpigmentation of skin has improved. Denies foot pain or excessive itching. Of note, the patient reports feelings of gassiness. Denies abdominal pain or heartburn, but feels discomfort. She also reports worsening constipation lately. She reports having endoscopy in the past which was normal. Denies fever, chills, nausea or vomiting. \par \par Regarding the patient's diabetes, she currently takes metformin 1000mg BID and routinely goes for walks everyday. Despite multiple education attempts, she does not check blood sugar at home due to limited understanding. She lives alone and she has limited assistance. Does not check blood pressure at home for the same reason. She is compliant with other medications, including daily baby aspirin, atorvastatin, amlodipine, and hctz. The patient has been mainly attempting lifestyle modifications for weight loss.\par \par Of note, the patient was recently recommended by her ophthalmologist to have a pterygium removed from her left eye. The patient denies changes in vision or eye pain. Discussed risks vs benefits of procedure and patient preferred to hold off procedure at this time. Will reconsider if symptoms develop. \par

## 2022-08-30 NOTE — PHYSICAL EXAM
[No Acute Distress] : no acute distress [Well Nourished] : well nourished [Well Developed] : well developed [PERRL] : pupils equal round and reactive to light [EOMI] : extraocular movements intact [Normal Outer Ear/Nose] : the outer ears and nose were normal in appearance [Normal Oropharynx] : the oropharynx was normal [No Lymphadenopathy] : no lymphadenopathy [No Respiratory Distress] : no respiratory distress  [Clear to Auscultation] : lungs were clear to auscultation bilaterally [Normal Rate] : normal rate  [Regular Rhythm] : with a regular rhythm [No Edema] : there was no peripheral edema [No Extremity Clubbing/Cyanosis] : no extremity clubbing/cyanosis [Soft] : abdomen soft [Non Tender] : non-tender [Non-distended] : non-distended [No CVA Tenderness] : no CVA  tenderness [No Joint Swelling] : no joint swelling [No Focal Deficits] : no focal deficits [Normal Gait] : normal gait [Normal Affect] : the affect was normal [Normal Mood] : the mood was normal [de-identified] : Obese [de-identified] : Hyperpigmentation of bilateral dorsal surfaces of the foot, improved from prior

## 2022-09-29 ENCOUNTER — APPOINTMENT (OUTPATIENT)
Dept: OBGYN | Facility: HOSPITAL | Age: 67
End: 2022-09-29

## 2022-11-10 ENCOUNTER — APPOINTMENT (OUTPATIENT)
Dept: OBGYN | Facility: HOSPITAL | Age: 67
End: 2022-11-10
Payer: MEDICAID

## 2022-11-10 ENCOUNTER — RESULT REVIEW (OUTPATIENT)
Age: 67
End: 2022-11-10

## 2022-11-10 ENCOUNTER — OUTPATIENT (OUTPATIENT)
Dept: OUTPATIENT SERVICES | Facility: HOSPITAL | Age: 67
LOS: 1 days | End: 2022-11-10

## 2022-11-10 VITALS
WEIGHT: 223 LBS | HEIGHT: 62 IN | TEMPERATURE: 97.6 F | SYSTOLIC BLOOD PRESSURE: 137 MMHG | HEART RATE: 91 BPM | DIASTOLIC BLOOD PRESSURE: 73 MMHG | BODY MASS INDEX: 41.04 KG/M2

## 2022-11-10 PROCEDURE — ZZZZZ: CPT

## 2022-11-10 NOTE — PHYSICAL EXAM
[Chaperone Present] : A chaperone was present in the examining room during all aspects of the physical examination [Appropriately responsive] : appropriately responsive [Alert] : alert [No Acute Distress] : no acute distress [No Lymphadenopathy] : no lymphadenopathy [Regular Rate Rhythm] : regular rate rhythm [Clear to Auscultation B/L] : clear to auscultation bilaterally [Soft] : soft [Non-tender] : non-tender [No Lesions] : no lesions [No Mass] : no mass [Oriented x3] : oriented x3 [Examination Of The Breasts] : a normal appearance [Vulvar Atrophy] : vulvar atrophy [Labia Majora] : normal [Atrophy] : atrophy [Dry Mucosa] : dry mucosa [No Bleeding] : There was no active vaginal bleeding [Normal] : normal

## 2022-11-11 DIAGNOSIS — Z00.00 ENCOUNTER FOR GENERAL ADULT MEDICAL EXAMINATION WITHOUT ABNORMAL FINDINGS: ICD-10-CM

## 2022-11-11 LAB — HPV HIGH+LOW RISK DNA PNL CVX: SIGNIFICANT CHANGE UP

## 2022-11-15 LAB — CYTOLOGY SPEC DOC CYTO: SIGNIFICANT CHANGE UP

## 2022-11-18 ENCOUNTER — APPOINTMENT (OUTPATIENT)
Dept: INTERNAL MEDICINE | Facility: CLINIC | Age: 67
End: 2022-11-18

## 2022-11-18 ENCOUNTER — OUTPATIENT (OUTPATIENT)
Dept: OUTPATIENT SERVICES | Facility: HOSPITAL | Age: 67
LOS: 1 days | End: 2022-11-18

## 2022-11-18 ENCOUNTER — MED ADMIN CHARGE (OUTPATIENT)
Age: 67
End: 2022-11-18

## 2022-11-18 VITALS
OXYGEN SATURATION: 98 % | BODY MASS INDEX: 41.45 KG/M2 | HEIGHT: 62 IN | HEART RATE: 96 BPM | WEIGHT: 225.25 LBS | RESPIRATION RATE: 20 BRPM | DIASTOLIC BLOOD PRESSURE: 80 MMHG | TEMPERATURE: 98.4 F | SYSTOLIC BLOOD PRESSURE: 132 MMHG

## 2022-11-18 DIAGNOSIS — R10.13 EPIGASTRIC PAIN: ICD-10-CM

## 2022-11-18 DIAGNOSIS — K59.00 CONSTIPATION, UNSPECIFIED: ICD-10-CM

## 2022-11-18 DIAGNOSIS — B35.3 TINEA PEDIS: ICD-10-CM

## 2022-11-18 PROCEDURE — 99214 OFFICE O/P EST MOD 30 MIN: CPT | Mod: GC

## 2022-11-22 DIAGNOSIS — Z23 ENCOUNTER FOR IMMUNIZATION: ICD-10-CM

## 2022-11-22 DIAGNOSIS — E66.01 MORBID (SEVERE) OBESITY DUE TO EXCESS CALORIES: ICD-10-CM

## 2022-11-22 DIAGNOSIS — B35.3 TINEA PEDIS: ICD-10-CM

## 2022-11-22 DIAGNOSIS — E11.69 TYPE 2 DIABETES MELLITUS WITH OTHER SPECIFIED COMPLICATION: ICD-10-CM

## 2022-11-22 DIAGNOSIS — R10.13 EPIGASTRIC PAIN: ICD-10-CM

## 2022-11-22 NOTE — HEALTH RISK ASSESSMENT
[Never] : Never [0-4] : 0-4 [No] : No [Never (0 pts)] : Never (0 points) [0] : 2) Feeling down, depressed, or hopeless: Not at all (0) [PHQ-2 Negative - No further assessment needed] : PHQ-2 Negative - No further assessment needed [de-identified] : walking, staying busy  [de-identified] : Trying to incorporate fiber, trying to eat less sugary, no longer eating meat, mainly eats fish

## 2022-11-22 NOTE — PHYSICAL EXAM
[No Acute Distress] : no acute distress [Well Developed] : well developed [Well-Appearing] : well-appearing [Normal Sclera/Conjunctiva] : normal sclera/conjunctiva [EOMI] : extraocular movements intact [No JVD] : no jugular venous distention [Supple] : supple [No Respiratory Distress] : no respiratory distress  [No Accessory Muscle Use] : no accessory muscle use [Clear to Auscultation] : lungs were clear to auscultation bilaterally [Normal Rate] : normal rate  [Regular Rhythm] : with a regular rhythm [No Murmur] : no murmur heard [No Edema] : there was no peripheral edema [No CVA Tenderness] : no CVA  tenderness [No Joint Swelling] : no joint swelling [Coordination Grossly Intact] : coordination grossly intact [No Focal Deficits] : no focal deficits [Normal Affect] : the affect was normal [Normal Insight/Judgement] : insight and judgment were intact [de-identified] : Obese habitus

## 2022-11-22 NOTE — HISTORY OF PRESENT ILLNESS
[FreeTextEntry1] : T2DM follow-up appointment [de-identified] : Patient is a 66 year-old female with PMH of HTN, T2DM, HLD, and tinea pedis presenting for a follow-up appointment of chronic conditions. No ED visits since her last appointment. Pt has no complaints at this time. Pt reports no issues with sleep. Pt states she is complaint with her medications (including metformin, daily baby aspirin, atorvastatin, amlodipine, and HCTZ).\par \par T2DM - currently taking metformin 1000 mg BID. Reports daily exercise (walking) and is trying to incorporate more fiber into her diet. Pt does not check her blood sugar at home and is not interested in a home glucometer at this time. Pt requests that her blood sugar is taken during her appointment today. Pt states she has been trying to lose weight through lifestyle modifications (more walking, more fiber, and less sugary foods). \par \par Dyspepsia - Pt reports that she has symptoms of bloating, constipation, and gas. At last visit, pt was started on miralax and simethicone. Pt states that she was not compliant with these medications and is willing to retry the regimen.\par \par Tinea pedis - Pt's rash has resolved. No current symptoms or pain.

## 2022-11-22 NOTE — REVIEW OF SYSTEMS
[Depression] : depression [Fever] : no fever [Chills] : no chills [Hearing Loss] : no hearing loss [Nasal Discharge] : no nasal discharge [Sore Throat] : no sore throat [Chest Pain] : no chest pain [Palpitations] : no palpitations [Abdominal Pain] : no abdominal pain [Nausea] : no nausea [Insomnia] : no insomnia [Anxiety] : no anxiety

## 2022-11-22 NOTE — ASSESSMENT
[FreeTextEntry1] : Assessment and plan as documented above with additions below:\par \par HCM\par - flu and PNA vaccine today\par - serum a1c\par - Pt has referral from OB for DEXA scan and mammogram\par - UTD on Tdap, zoster\par - HCV, HIV negative\par \par Discussed case with Dr. Morgan. RTC in 3 months. \par \par Nichole Ervin M.D., PGY-1

## 2022-11-25 ENCOUNTER — RX RENEWAL (OUTPATIENT)
Age: 67
End: 2022-11-25

## 2022-11-25 RX ORDER — ASPIRIN 81 MG/1
81 TABLET, COATED ORAL
Qty: 30 | Refills: 3 | Status: ACTIVE | COMMUNITY
Start: 2021-06-28 | End: 1900-01-01

## 2022-12-30 NOTE — HISTORY OF PRESENT ILLNESS
[FreeTextEntry1] : 68 yo  LMP () presents for annual physical. Patient reports she is doing well. Denies fever, chills, N/V/D, constipation, urinary frequency, hematuria, vaginal odor, vaginal discharge, vaginal bleeding, or headache.\par \par Sexual Health: pt is not sexually active. Does not use contraceptives.\par OBHx: NSVDx6\par GynHx: denies\par PMHx: HTN, DM, HLD\par SurgHx: BTL ()\par \par HCM: \par  - Pap smear: unknown when last pap smear was performed \par  - Mammogram: 2 years ago \par

## 2023-02-13 ENCOUNTER — APPOINTMENT (OUTPATIENT)
Dept: INTERNAL MEDICINE | Facility: CLINIC | Age: 68
End: 2023-02-13
Payer: MEDICAID

## 2023-02-13 ENCOUNTER — OUTPATIENT (OUTPATIENT)
Dept: OUTPATIENT SERVICES | Facility: HOSPITAL | Age: 68
LOS: 1 days | End: 2023-02-13

## 2023-02-13 ENCOUNTER — LABORATORY RESULT (OUTPATIENT)
Age: 68
End: 2023-02-13

## 2023-02-13 VITALS
HEART RATE: 88 BPM | OXYGEN SATURATION: 97 % | DIASTOLIC BLOOD PRESSURE: 66 MMHG | WEIGHT: 217 LBS | SYSTOLIC BLOOD PRESSURE: 118 MMHG | HEIGHT: 62 IN | BODY MASS INDEX: 39.93 KG/M2

## 2023-02-13 DIAGNOSIS — E66.01 MORBID (SEVERE) OBESITY DUE TO EXCESS CALORIES: ICD-10-CM

## 2023-02-13 DIAGNOSIS — M21.42 FLAT FOOT [PES PLANUS] (ACQUIRED), RIGHT FOOT: ICD-10-CM

## 2023-02-13 DIAGNOSIS — Z87.898 PERSONAL HISTORY OF OTHER SPECIFIED CONDITIONS: ICD-10-CM

## 2023-02-13 DIAGNOSIS — E11.69 TYPE 2 DIABETES MELLITUS WITH OTHER SPECIFIED COMPLICATION: ICD-10-CM

## 2023-02-13 DIAGNOSIS — R21 RASH AND OTHER NONSPECIFIC SKIN ERUPTION: ICD-10-CM

## 2023-02-13 DIAGNOSIS — R14.0 ABDOMINAL DISTENSION (GASEOUS): ICD-10-CM

## 2023-02-13 DIAGNOSIS — N64.59 OTHER SIGNS AND SYMPTOMS IN BREAST: ICD-10-CM

## 2023-02-13 DIAGNOSIS — M62.89 OTHER SPECIFIED DISORDERS OF MUSCLE: ICD-10-CM

## 2023-02-13 DIAGNOSIS — Z86.69 PERSONAL HISTORY OF OTHER DISEASES OF THE NERVOUS SYSTEM AND SENSE ORGANS: ICD-10-CM

## 2023-02-13 DIAGNOSIS — M94.279 CHONDROMALACIA, UNSPECIFIED ANKLE AND JOINTS OF FOOT: ICD-10-CM

## 2023-02-13 DIAGNOSIS — E66.9 TYPE 2 DIABETES MELLITUS WITH OTHER SPECIFIED COMPLICATION: ICD-10-CM

## 2023-02-13 DIAGNOSIS — M21.869 OTHER SPECIFIED ACQUIRED DEFORMITIES OF UNSPECIFIED LOWER LEG: ICD-10-CM

## 2023-02-13 DIAGNOSIS — N62 HYPERTROPHY OF BREAST: ICD-10-CM

## 2023-02-13 DIAGNOSIS — Z86.010 PERSONAL HISTORY OF COLONIC POLYPS: ICD-10-CM

## 2023-02-13 DIAGNOSIS — M21.41 FLAT FOOT [PES PLANUS] (ACQUIRED), RIGHT FOOT: ICD-10-CM

## 2023-02-13 DIAGNOSIS — L81.0 POSTINFLAMMATORY HYPERPIGMENTATION: ICD-10-CM

## 2023-02-13 DIAGNOSIS — M25.532 PAIN IN RIGHT WRIST: ICD-10-CM

## 2023-02-13 DIAGNOSIS — M25.531 PAIN IN RIGHT WRIST: ICD-10-CM

## 2023-02-13 PROCEDURE — 99214 OFFICE O/P EST MOD 30 MIN: CPT | Mod: GC

## 2023-02-13 RX ORDER — LOSARTAN POTASSIUM 50 MG/1
50 TABLET, FILM COATED ORAL DAILY
Qty: 90 | Refills: 3 | Status: ACTIVE | COMMUNITY
Start: 2023-02-13 | End: 1900-01-01

## 2023-02-14 ENCOUNTER — NON-APPOINTMENT (OUTPATIENT)
Age: 68
End: 2023-02-14

## 2023-02-14 DIAGNOSIS — E11.69 TYPE 2 DIABETES MELLITUS WITH OTHER SPECIFIED COMPLICATION: ICD-10-CM

## 2023-02-14 DIAGNOSIS — R14.0 ABDOMINAL DISTENSION (GASEOUS): ICD-10-CM

## 2023-02-14 DIAGNOSIS — E66.01 MORBID (SEVERE) OBESITY DUE TO EXCESS CALORIES: ICD-10-CM

## 2023-02-14 LAB
ALBUMIN SERPL ELPH-MCNC: 4.6 G/DL
ALP BLD-CCNC: 77 U/L
ALT SERPL-CCNC: 18 U/L
ANION GAP SERPL CALC-SCNC: 13 MMOL/L
APPEARANCE: ABNORMAL
AST SERPL-CCNC: 20 U/L
BILIRUB SERPL-MCNC: 0.5 MG/DL
BILIRUBIN URINE: NEGATIVE
BLOOD URINE: NEGATIVE
BUN SERPL-MCNC: 13 MG/DL
CALCIUM SERPL-MCNC: 10.2 MG/DL
CHLORIDE SERPL-SCNC: 103 MMOL/L
CO2 SERPL-SCNC: 28 MMOL/L
COLOR: YELLOW
CREAT SERPL-MCNC: 0.58 MG/DL
EGFR: 100 ML/MIN/1.73M2
ESTIMATED AVERAGE GLUCOSE: 146 MG/DL
GLUCOSE QUALITATIVE U: NEGATIVE
GLUCOSE SERPL-MCNC: 124 MG/DL
HBA1C MFR BLD HPLC: 6.7 %
KETONES URINE: NEGATIVE
LEUKOCYTE ESTERASE URINE: ABNORMAL
NITRITE URINE: NEGATIVE
PH URINE: 7
POTASSIUM SERPL-SCNC: 3.7 MMOL/L
PROT SERPL-MCNC: 6.8 G/DL
PROTEIN URINE: ABNORMAL
SODIUM SERPL-SCNC: 144 MMOL/L
SPECIFIC GRAVITY URINE: 1.03
UROBILINOGEN URINE: ABNORMAL

## 2023-02-14 NOTE — HISTORY OF PRESENT ILLNESS
[FreeTextEntry1] : Follow up [de-identified] : Ms. Landaverde is a 66 y/o F with PMHx of HTN, DMII, HLD, venous insufficiency, and tinea pedis who is presenting for follow up chronic conditions. No acute concerns today. \par \par In the interim, the patient's foot rash has improved. She has been applying hydrocortisone ointment and terbinafine per Dermatology recommendations from recent visit. She reports hyperpigmentation of skin has improved. Denies foot pain or excessive itching. She reports visiting a doctor in the [place] who has been doing procedures to facilitate movement of blood through her veins. They told her her veins do not move fluid well. She is not sure what procedure they are performing, but she reports improvement in foot swelling and mobility.\par \par Of note, the patient reports feelings of gassiness and bloating. Denies abdominal pain or heartburn, but feels discomfort. She also reports worsening constipation. She reports having endoscopy in the past which was normal. Colonoscopy in 2019 was normal. Denies fever, chills, nausea or vomiting. \par \par Regarding the patient's diabetes, she currently takes metformin 1000mg BID and routinely goes for walks everyday. She lives alone, ADLs and IADLs intact. Has a niece who lives nearby, but rest of family is in [place]. Does not routinely check blood pressure at home. She is compliant with other medications, including daily baby aspirin, atorvastatin, amlodipine, and hctz. The patient has been mainly attempting lifestyle modifications for weight loss.\par

## 2023-02-14 NOTE — PHYSICAL EXAM
[No Acute Distress] : no acute distress [Well Nourished] : well nourished [Well Developed] : well developed [PERRL] : pupils equal round and reactive to light [EOMI] : extraocular movements intact [Normal Outer Ear/Nose] : the outer ears and nose were normal in appearance [Normal Oropharynx] : the oropharynx was normal [No Lymphadenopathy] : no lymphadenopathy [No Respiratory Distress] : no respiratory distress  [Clear to Auscultation] : lungs were clear to auscultation bilaterally [Normal Rate] : normal rate  [Regular Rhythm] : with a regular rhythm [No Edema] : there was no peripheral edema [No Extremity Clubbing/Cyanosis] : no extremity clubbing/cyanosis [Soft] : abdomen soft [Non Tender] : non-tender [Non-distended] : non-distended [No CVA Tenderness] : no CVA  tenderness [No Joint Swelling] : no joint swelling [No Focal Deficits] : no focal deficits [Normal Gait] : normal gait [Normal Affect] : the affect was normal [Normal Mood] : the mood was normal [de-identified] : Obese [de-identified] : Hyperpigmentation of bilateral dorsal surfaces of the foot, improved from prior

## 2023-02-14 NOTE — ASSESSMENT
[FreeTextEntry1] : Ms. Landaverde is a 66 y/o F with PMHx of HTN, DMII, HLD, venous insufficiency, and tinea pedis who is presenting for follow up chronic conditions. No acute concerns today. \par \par #Type II diabetes\par - last A1C 6.7, POCT A1C not performed today?\par - currently on metformin 1000mg BID\par - sent Rybelsus to pharmacy to start GLP-1 for diabetes and weight loss\par - up to date with Ophtho\par - diabetic foot exam performed today was wnl\par - follows with podiatry\par \par #Dyspepsia\par - reports gassiness that builds up and associated constipation\par - constipation may be underlying feelings of gassiness, so will opt to optimize constipation; patient also reports history of EGD which she was told showed slowed digestion\par - recommended miralax and simethicone for symptomatic management\par - encouraged hydration for constipation\par - if no improvement in symptoms, consider repeat EGD to evaluate for worsening gastroparesis vs symptomatic H. Pylori\par \par #HTN\par - /66, did not take anti-hypertensives this morning\par - likely improving with weight loss\par - stop amlodipine and hctz, transition to losartan 50 since patient is diabetic, would benefit from ACE for cardio/nephro protective effects\par - obtain CMP today prior to initiating ACE\par \par #HLD\par - c/w atorvastatin 20mg qhs\par - lipid profile 8/2022 wnl\par \par #Class III obesity\par - BMI ~ 40\par - excellent weight loss with metformin and lifestyle modification\par - added Rybelsus today to assist further, uptitrate as tolerated\par - deferred weight management clinic referral because patient has limited transportation for multiple visits\par \par #Tinea pedis, improved\par - continue with terbinafine\par - continue with steroid ointment for concomitant eczema per Dermatology\par - if either worsens, consider re-eval by Dermatology\par \par #HCM\par Flu: up to date\par COVID: vaccinated x2, ask about COVID booster\par TDAP: last 4/2015\par PPSV23: last 4/2015\par Shingles: Zostavax 2018\par Colorectal cancer: colonoscopy 2019 wnl, repeat in 10 years\par HIV: negative 2017\par Hepatitis C: negative 2015\par Cervical Cancer: 2022 wnl, does not require any further\par Breast Cancer: mammogram re-ordered\par Osteoporosis: DEXA re-ordered this visit\par \par Discussed case with Dr. Marc. \par \par RTC in 5-10 weeks to see how tolerating Rybelsus, evaluate abdominal symptoms to see if she needs EGD, would obtain H. Pylori stool antigen at next visit. Also patient mentioned having insomnia/waking up to urinate at end of visit (discuss further at next visit).\par \par Anthony Figueroa, PGY-2\par Internal Medicine\par [employer] [facility] \par \par \par \par \par

## 2023-02-21 ENCOUNTER — RX RENEWAL (OUTPATIENT)
Age: 68
End: 2023-02-21

## 2023-02-21 RX ORDER — ORAL SEMAGLUTIDE 3 MG/1
3 TABLET ORAL DAILY
Qty: 30 | Refills: 0 | Status: ACTIVE | COMMUNITY
Start: 2023-02-13 | End: 1900-01-01

## 2023-03-20 ENCOUNTER — APPOINTMENT (OUTPATIENT)
Dept: INTERNAL MEDICINE | Facility: CLINIC | Age: 68
End: 2023-03-20

## 2023-04-10 ENCOUNTER — RX RENEWAL (OUTPATIENT)
Age: 68
End: 2023-04-10

## 2023-04-10 RX ORDER — GABAPENTIN 100 MG/1
100 CAPSULE ORAL
Qty: 180 | Refills: 0 | Status: ACTIVE | COMMUNITY
Start: 2022-02-10 | End: 1900-01-01

## 2023-04-11 ENCOUNTER — RX RENEWAL (OUTPATIENT)
Age: 68
End: 2023-04-11

## 2023-06-01 ENCOUNTER — APPOINTMENT (OUTPATIENT)
Dept: INTERNAL MEDICINE | Facility: CLINIC | Age: 68
End: 2023-06-01

## 2024-02-27 NOTE — DISCUSSION/SUMMARY
[FreeTextEntry1] : 66 yo  LMP () presents for annual physical. Patient is doing well and w/o any complaints.\par \par #HCM\par - Pap smear performed \par - Mammogram and B/L breast US ordered\par - DEXA scan ordered\par - f/u in 6 months\par \par D/w Dr. Saldivar, Attending Physician \par \par Chelo Jordan, PGY1
Refer to the Assessment tab to view/cancel completed assessment.